# Patient Record
Sex: FEMALE | Race: WHITE | Employment: FULL TIME | URBAN - METROPOLITAN AREA
[De-identification: names, ages, dates, MRNs, and addresses within clinical notes are randomized per-mention and may not be internally consistent; named-entity substitution may affect disease eponyms.]

---

## 2018-02-27 ENCOUNTER — HOSPITAL ENCOUNTER (EMERGENCY)
Facility: CLINIC | Age: 38
Discharge: HOME | End: 2018-02-27
Attending: PHYSICIAN ASSISTANT | Admitting: PHYSICIAN ASSISTANT

## 2018-02-27 VITALS
DIASTOLIC BLOOD PRESSURE: 80 MMHG | RESPIRATION RATE: 16 BRPM | SYSTOLIC BLOOD PRESSURE: 143 MMHG | TEMPERATURE: 98.5 F | WEIGHT: 140 LBS | BODY MASS INDEX: 22.5 KG/M2 | OXYGEN SATURATION: 100 % | HEART RATE: 104 BPM | HEIGHT: 66 IN

## 2018-02-27 DIAGNOSIS — H10.33 ACUTE CONJUNCTIVITIS OF BOTH EYES, UNSPECIFIED ACUTE CONJUNCTIVITIS TYPE: ICD-10-CM

## 2018-02-27 PROCEDURE — 99282 EMERGENCY DEPT VISIT SF MDM: CPT

## 2018-02-27 RX ORDER — POLYMYXIN B SULFATE AND TRIMETHOPRIM 1; 10000 MG/ML; [USP'U]/ML
2 SOLUTION OPHTHALMIC
Qty: 6 ML | Refills: 0 | Status: SHIPPED | OUTPATIENT
Start: 2018-02-27 | End: 2018-03-06

## 2018-02-27 RX ORDER — TOBRAMYCIN 3 MG/ML
1 SOLUTION/ DROPS OPHTHALMIC 4 TIMES DAILY
COMMUNITY

## 2018-02-27 ASSESSMENT — ENCOUNTER SYMPTOMS
HEADACHES: 0
EYE ITCHING: 1
COUGH: 0
SORE THROAT: 1
EYE DISCHARGE: 1
EYE PAIN: 1
EYE REDNESS: 1

## 2018-02-27 NOTE — ED AVS SNAPSHOT
[facility] Emergency Department    [address]    [facility] [address]    Phone:  [phone]    Fax:  [phone]                                       Morelia Clement   MRN: [id number]    Department:  [facility] Emergency Department   Date of Visit:  2/27/2018           Patient Information     Date Of Birth          1980        Your diagnoses for this visit were:     Acute conjunctivitis of both eyes, unspecified acute conjunctivitis type        You were seen by Lillian Myles PA-C.      Follow-up Information     Follow up with [facility] In 2 days.    Specialties:  Sports Medicine, Pain Management, Obstetrics & Gynecology, Pediatrics, Internal Medicine, Nephrology    Why:  As needed    Contact information:    [address]  [facility] [place] [address] [phone]        Follow up with [facility] Emergency Department.    Specialty:  EMERGENCY MEDICINE    Why:  If symptoms worsen    Contact information:    [address]  [facility] [place] [address] [phone]        Discharge Instructions         You can try Allegra or Zyrtec or Claritin if this is an allergic conjunctivitis.     Conjunctivitis, Nonspecific    The membrane that covers the white part of your eye (the conjunctiva) is inflamed. Inflammation happens when your body responds to an injury, allergic reaction, infection, or illness. Symptoms of inflammation in the eye may include redness, irritation, itching, swelling, or burning. These symptoms should go away within the next 24 hours. Conjunctivitis may be related to a particle that was in your eye. If so, it may wash out with your tears or irrigation treatment. Being exposed to liquid chemicals or fumes may also cause this reaction.   Home care    Apply a cold pack over the eye for 20 minutes at a time. This will reduce pain. To make a cold pack, put ice cubes in a plastic  bag that seals at the top. Wrap the bag in a clean, thin towel or cloth.    Artificial tears may be prescribed to reduce irritation or redness.  These should be used 3 to 4 times a day.    You may use acetaminophen or ibuprofen to control pain, unless another medicine was prescribed. (Note: If you have chronic liver or kidney disease, or if you have ever had a stomach ulcer or gastrointestinal bleeding, talk with your healthcare provider before using these medicines.)  Follow-up care  Follow up with your healthcare provider, or as advised.  When to seek medical advice  Call your healthcare provider right away if any of these occur:    Increased eyelid swelling    Increased eye pain    Increased redness or drainage from the eye    Increased blurry vision or increased sensitivity to light    Failure of normal vision to return within 24 to 48 hours  Date Last Reviewed: 7/1/2017 2000-2017 [employer]. [address] [place] [address]. All rights reserved. This information is not intended as a substitute for professional medical care. Always follow your healthcare professional's instructions.          24 Hour Appointment Hotline       To make an appointment at any [facility], call [phone] ([phone]). If you don't have a family doctor or clinic, we will help you find one. [facility] are conveniently located to serve the needs of you and your family.             Review of your medicines      START taking        Dose / Directions Last dose taken    trimethoprim-polymyxin b ophthalmic solution   Commonly known as:  POLYTRIM   Dose:  2 drop   Quantity:  6 mL        Place 2 drops into both eyes every 3 hours for 7 days   Refills:  0          Our records show that you are taking the medicines listed below. If these are incorrect, please call your family doctor or clinic.        Dose / Directions Last dose taken    tobramycin 0.3 % ophthalmic solution   Commonly known as:  TOBREX    Dose:  1 drop        Place 1 drop into both eyes 4 times daily   Refills:  0                Prescriptions were sent or printed at these locations (1 Prescription)                   Other Prescriptions                Printed at Department/Unit printer (1 of 1)         trimethoprim-polymyxin b (POLYTRIM) ophthalmic solution                Orders Needing Specimen Collection     None      Pending Results     No orders found from 2/25/2018 to 2/28/2018.            Pending Culture Results     No orders found from 2/25/2018 to 2/28/2018.            Pending Results Instructions     If you had any lab results that were not finalized at the time of your Discharge, you can call the ED Lab Result RN at [phone]. You will be contacted by this team for any positive Lab results or changes in treatment. The nurses are available 7 days a week from 10A to 6:30P.  You can leave a message 24 hours per day and they will return your call.        Test Results From Your Hospital Stay               Clinical Quality Measure: Blood Pressure Screening     Your blood pressure was checked while you were in the emergency department today. The last reading we obtained was  BP: 143/80 . Please read the guidelines below about what these numbers mean and what you should do about them.  If your systolic blood pressure (the top number) is less than 120 and your diastolic blood pressure (the bottom number) is less than 80, then your blood pressure is normal. There is nothing more that you need to do about it.  If your systolic blood pressure (the top number) is 120-139 or your diastolic blood pressure (the bottom number) is 80-89, your blood pressure may be higher than it should be. You should have your blood pressure rechecked within a year by a primary care provider.  If your systolic blood pressure (the top number) is 140 or greater or your diastolic blood pressure (the bottom number) is 90 or greater, you may have high blood pressure. High  "blood pressure is treatable, but if left untreated over time it can put you at risk for heart attack, stroke, or kidney failure. You should have your blood pressure rechecked by a primary care provider within the next 4 weeks.  If your provider in the emergency department today gave you specific instructions to follow-up with your doctor or provider even sooner than that, you should follow that instruction and not wait for up to 4 weeks for your follow-up visit.        Thank you for choosing [facility]       Thank you for choosing [facility] for your care. Our goal is always to provide you with excellent care. Hearing back from our patients is one way we can continue to improve our services. Please take a few minutes to complete the written survey that you may receive in the mail after you visit with us. Thank you!        [employer] Information     [employer] lets you send messages to your doctor, view your test results, renew your prescriptions, schedule appointments and more. To sign up, go to [email or web address] . Click on \"Log in\" on the left side of the screen, which will take you to the Welcome page. Then click on \"Sign up Now\" on the right side of the page.     You will be asked to enter the access code listed below, as well as some personal information. Please follow the directions to create your username and password.     Your access code is: [email or web address]  Expires: 2018  4:44 PM     Your access code will  in 90 days. If you need help or a new code, please call your [facility] clinic or [phone].        Care EveryWhere ID     This is your Care EveryWhere ID. This could be used by other organizations to access your [facility] medical records  [id number]        Equal Access to Services     SELINA MARIN : nadira Nicole, melanie womack. So [facility] [phone].    ATENCIÓN: Si habla español, tiene a mosher disposición " servicios gratuitos de asistencia lingüística. Kevin quijano [phone].    We comply with applicable federal civil rights laws and [place] laws. We do not discriminate on the basis of race, color, national origin, age, disability, sex, sexual orientation, or gender identity.            After Visit Summary       This is your record. Keep this with you and show to your community pharmacist(s) and doctor(s) at your next visit.

## 2018-02-27 NOTE — ED AVS SNAPSHOT
[facility] Emergency Department    [address]    [facility] [address]    Phone:  [phone]    Fax:  [phone]                                       Morelia Clement   MRN: [id number]    Department:  [facility] Emergency Department   Date of Visit:  2/27/2018           After Visit Summary Signature Page     I have received my discharge instructions, and my questions have been answered. I have discussed any challenges I see with this plan with the nurse or doctor.    ..........................................................................................................................................  Patient/Patient Representative Signature      ..........................................................................................................................................  Patient Representative Print Name and Relationship to Patient    ..................................................               ................................................  Date                                            Time    ..........................................................................................................................................  Reviewed by Signature/Title    ...................................................              ..............................................  Date                                                            Time

## 2018-02-27 NOTE — ED NOTES
Pt reports she was seen at [facility] for pink eye on 2/16/18 and given medication but her symptoms have only gotten worse. Pt states she has redness, itching, watery eyes and a yellow discharge.

## 2018-02-27 NOTE — DISCHARGE INSTRUCTIONS
You can try Allegra or Zyrtec or Claritin if this is an allergic conjunctivitis.     Conjunctivitis, Nonspecific    The membrane that covers the white part of your eye (the conjunctiva) is inflamed. Inflammation happens when your body responds to an injury, allergic reaction, infection, or illness. Symptoms of inflammation in the eye may include redness, irritation, itching, swelling, or burning. These symptoms should go away within the next 24 hours. Conjunctivitis may be related to a particle that was in your eye. If so, it may wash out with your tears or irrigation treatment. Being exposed to liquid chemicals or fumes may also cause this reaction.   Home care    Apply a cold pack over the eye for 20 minutes at a time. This will reduce pain. To make a cold pack, put ice cubes in a plastic bag that seals at the top. Wrap the bag in a clean, thin towel or cloth.    Artificial tears may be prescribed to reduce irritation or redness.  These should be used 3 to 4 times a day.    You may use acetaminophen or ibuprofen to control pain, unless another medicine was prescribed. (Note: If you have chronic liver or kidney disease, or if you have ever had a stomach ulcer or gastrointestinal bleeding, talk with your healthcare provider before using these medicines.)  Follow-up care  Follow up with your healthcare provider, or as advised.  When to seek medical advice  Call your healthcare provider right away if any of these occur:    Increased eyelid swelling    Increased eye pain    Increased redness or drainage from the eye    Increased blurry vision or increased sensitivity to light    Failure of normal vision to return within 24 to 48 hours  Date Last Reviewed: 7/1/2017 2000-2017 [employer]. [address] [place] [address], [place] [address]. All rights reserved. This information is not intended as a substitute for professional medical care. Always follow your healthcare professional's instructions.

## 2018-02-27 NOTE — ED PROVIDER NOTES
"  History     Chief Complaint:  Eye Problem      HPI   Morelia Clement is a 37 year old female smoker who presents to the emergency department today for evaluation of an eye problem. The patient reports that she developed bilateral eye watering and redness on 02/14/2018. She states that the bilateral eyes then became dry, itchy, and scratchy/irritated feeling. She reports that she was evaluated at the [facility] on 02/16/2018 and was started on a 7 day course of tobramycin drops for conjunctivitis. She states that since she started these drops, her symptoms have worsened significantly. She describes watery, yellow discharge and adds that her eyes have crusted over when she wakes up in the morning. She denies vision changes, ear pain, cough, or headache. She states that she had a sore throat on 02/17/2018 and that this has since resolved. She notes some sneezing which she suspects is due to allergies.    Allergies:  No Known Drug Allergies     Medications:    Tobramycin ophthalmic solution    Past Medical History:    History reviewed. No pertinent medical history.    Past Surgical History:    History reviewed. No pertinent surgical history.    Family History:    History reviewed. No pertinent family history.    Social History:  Smoking Status: Current Every Day Smoker - 0.5 packs per day  Marital Status:  Single     Review of Systems   HENT: Positive for sneezing and sore throat (resolved). Negative for ear pain.    Eyes: Positive for pain (bilateral), discharge (yellow, watery, bilateral), redness (bilateral) and itching (bilateral). Negative for visual disturbance.        Positive for bilateral eye dryness.   Respiratory: Negative for cough.    Neurological: Negative for headaches.   All other systems reviewed and are negative.    Physical Exam     Patient Vitals for the past 24 hrs:   BP Temp Temp src Pulse Resp SpO2 Height Weight   02/27/18 1516 143/80 98.5  F (36.9  C) Oral 104 16 100 % 1.676 m (5' 6\") " 63.5 kg (140 lb)     Physical Exam  Constitutional: Alert, attentive  HENT:    Nose: Nose normal.    Mouth/Throat: Oropharynx is clear, mucous membranes are moist   Eyes: EOM are normal. Pupils are equal, round, and reactive to light. There is bilateral conjunctival injection with mattering discharge. No pain with EOM.   CV: regular rate and rhythm  Chest: Effort normal and breath sounds normal.   MSK: Normal range of motion.   Neurological: Alert, attentive  Skin: Skin is warm and dry.     Emergency Department Course     Emergency Department Course:  Nursing notes and vitals reviewed.  I performed an exam of the patient as documented above.   1635 I discussed the treatment plan with the patient. They expressed understanding of this plan and consented to discharge. They will be discharged home with instructions for care and follow up. In addition, the patient will return to the emergency department if their symptoms persist, worsen, if new symptoms arise or if there is any concern.  All questions were answered.    Impression & Plan      Medical Decision Making:  Morelia Clement is a 37 year old female who presents for evaluation of bilateral eye redness.  A broad differential diagnosis was considered including bacterial conjunctivitis, viral conjunctivitis, foreign body, corneal abrasion, chemical vs allergic conjunctivitis, corneal ulcer, HSV, herpes zoster ophthalmicus, endophthalmitis, orbital cellulitis, etc.  Signs and symptoms consistent with an allergic versus bacterial conjunctivitis. I suspect that this may be due to either an allergic reaction to the tobramycin drops versus seasonal allergies or bacterial conjunctivitis. I prescribed Polytrim drops and she may also try Zyrtec or Allegra. We discussed that if her symptoms persist despite both of these treatments, she should follow up with opthalmology on Friday. The patient voiced understanding and agreement with this plan. All questions were answered prior  to discharge.    Diagnosis:    ICD-10-CM    1. Acute conjunctivitis of both eyes, unspecified acute conjunctivitis type H10.33      Disposition:  The patient is discharged to home.    Discharge Medications:  Discharge Medication List as of 2/27/2018  4:45 PM      START taking these medications    Details   trimethoprim-polymyxin b (POLYTRIM) ophthalmic solution Place 2 drops into both eyes every 3 hours for 7 days, Disp-6 mL, R-0, Local Print           Scribe Disclosure:  I, Billy Kellogg, am serving as a scribe at 4:20 PM on 2/27/2018 to document services personally performed by Lillian Myles PA-C, based on my observations and the provider's statements to me.  [facility] EMERGENCY DEPARTMENT     Lillian Myles PA-C  02/27/18 6191

## 2018-03-02 ENCOUNTER — HOSPITAL ENCOUNTER (EMERGENCY)
Facility: CLINIC | Age: 38
Discharge: HOME | End: 2018-03-02
Attending: NURSE PRACTITIONER | Admitting: NURSE PRACTITIONER

## 2018-03-02 VITALS
SYSTOLIC BLOOD PRESSURE: 123 MMHG | RESPIRATION RATE: 16 BRPM | TEMPERATURE: 97.3 F | OXYGEN SATURATION: 99 % | DIASTOLIC BLOOD PRESSURE: 85 MMHG

## 2018-03-02 DIAGNOSIS — H10.33 ACUTE BACTERIAL CONJUNCTIVITIS OF BOTH EYES: ICD-10-CM

## 2018-03-02 PROCEDURE — 25000125 ZZHC RX 250

## 2018-03-02 PROCEDURE — 99283 EMERGENCY DEPT VISIT LOW MDM: CPT

## 2018-03-02 PROCEDURE — 87591 N.GONORRHOEAE DNA AMP PROB: CPT | Performed by: NURSE PRACTITIONER

## 2018-03-02 RX ORDER — TETRACAINE HYDROCHLORIDE 5 MG/ML
SOLUTION OPHTHALMIC
Status: COMPLETED
Start: 2018-03-02 | End: 2018-03-02

## 2018-03-02 RX ORDER — IBUPROFEN 600 MG/1
600 TABLET, FILM COATED ORAL ONCE
Status: DISCONTINUED | OUTPATIENT
Start: 2018-03-02 | End: 2018-03-02 | Stop reason: HOSPADM

## 2018-03-02 RX ORDER — IBUPROFEN 600 MG/1
600 TABLET, FILM COATED ORAL EVERY 6 HOURS PRN
Qty: 30 TABLET | Refills: 1 | Status: SHIPPED | OUTPATIENT
Start: 2018-03-02

## 2018-03-02 RX ADMIN — FLUORESCEIN SODIUM: 0.6 STRIP OPHTHALMIC at 13:30

## 2018-03-02 RX ADMIN — TETRACAINE HYDROCHLORIDE: 5 SOLUTION OPHTHALMIC at 13:00

## 2018-03-02 ASSESSMENT — ENCOUNTER SYMPTOMS
EYE REDNESS: 1
EYE DISCHARGE: 1
EYE PAIN: 1
EYE ITCHING: 1

## 2018-03-02 NOTE — ED PROVIDER NOTES
History     Chief Complaint:  Eye Drainage    HPI   Morelia Clement is a 37 year old female smoker who presents to the emergency department today for evaluation of an eye problem. The patient reports that she developed bilateral eye watering and redness on 02/14/2018. She states that the bilateral eyes then became dry, itchy, and scratchy/irritated feeling. She reports that she was evaluated at the [facility] on 02/16/2018 and was started on a 7 day course of tobramycin drops for conjunctivitis. These were not effective. She presented here 3 days ago for repeat evaluation, and was given a new Polytrim drop, as well as advise to try Zyrtec or Allegra. These were ineffective. She presents today again for evaluation of her ongoing symptoms. She describes watery, yellow discharge and adds that her eyes have crusted over when she wakes up in the morning. Her eyes are red and painful. She is unsure if her blurry vision is secondary to discharge or she is actually having a visual disturbance. She states that she had a sore throat on 02/17/2018 and that this has since resolved. She notes some sneezing which she suspects is due to allergies. She explains that she had been on a cruise in [place] 2 weeks prior to onset of symptoms. No contact lens use.     Allergies:  No Known Drug Allergies      Medications:    tobramycin (TOBREX) 0.3 % ophthalmic solution  trimethoprim-polymyxin b (POLYTRIM) ophthalmic solution     Past Medical History:    Endometriosis  No DM  No yeast infections      Past Surgical History:    History reviewed. No pertinent surgical history.     Family History:    History reviewed. No pertinent family history.     Social History:  Smoking Status: Current Every Day Smoker - 0.5 packs per day  Marital Status:  Single who presents with her boyfriend     Review of Systems   Eyes: Positive for pain, discharge, redness, itching and visual disturbance (she is unsure if it is blurry secondary to  discharge).     Physical Exam     Patient Vitals for the past 24 hrs:   BP Temp Temp src Heart Rate Resp SpO2   03/02/18 1227 123/85 97.3  F (36.3  C) Temporal 85 16 99 %      Physical Exam  Eyes: Pupils equally round  HENT: Head is normal in appearance. Oropharynx is normal with moist mucus membranes. The bilateral eyes are red and clearly irritated with watery discharge noted. No fluorescein uptake bilaterally to suggest corneal abrasion or foreign body.    Cardiovascular: Normal color of mucus membranes  Respiratory: Normal respiratory effort  Musculoskeletal: No asymmetry  Skin: Normal, without rash.  Lymphatic: No edema  Neurologic: Cranial nerves grossly intact, normal cognition, no apparent deficits.  Psychiatric: Normal affect.    Emergency Department Course     Laboratory:  Laboratory findings were communicated with the patient who voiced understanding of the findings.  Neisseria Gonorrhea PCR: in process  Neisseria Gonorrhea PCR: in process    Emergency Department Course:  Nursing notes and vitals reviewed.  I performed an exam of the patient as documented above.   Findings and plan explained to the patient. Patient discharged home with instructions regarding supportive care, medications and reasons to return. The importance of close follow-up was reviewed.      Impression & Plan      Medical Decision Making:   Morelia Clement is a 37 year old female who presents for evaluation of bilateral eye redness, irritation, and discharge.  A broad differential diagnosis was considered including bacterial conjunctivitis, viral conjunctivitis, foreign body, corneal abrasion, chemical vs allergic conjunctivitis, corneal ulcer, HSV, herpes zoster ophthalmicus, endophthalmitis, orbital cellulitis, etc.  Signs and symptoms consistent with a conjunctivitis, likely bacterial. Will give new antibiotics and have close follow-up of eye physician. I am concerned that she may be experiencing gonorrheal conjunctivitis due to her  persistent symptoms despite 2 different antibiotics. I have sent swabs to the [facility] for testing, and the patient will be notified in the next 3 days of these results.     Diagnosis:    ICD-10-CM    1. Acute bacterial conjunctivitis of both eyes H10.33 Neisseria gonorrhoea PCR     Neisseria gonorrhoeae PCR      Disposition:   Discharged to home with below prescriptions    Discharge Medications:  New Prescriptions    CIPROFLOXACIN (CIPRO) 0.3 %    Place 3 drops into both eyes 3 times daily    IBUPROFEN (ADVIL/MOTRIN) 600 MG TABLET    Take 1 tablet (600 mg) by mouth every 6 hours as needed for moderate pain     Scribe Disclosure:  I, Mariano Espinoza, am serving as a scribe at 12:33 PM on 3/2/2018 to document services personally performed by Lyla Courtney, REGGIE C*, based on my observations and the provider's statements to me.   [facility] EMERGENCY DEPARTMENT       Lyla Courtney APRN CNP  03/02/18 8801

## 2018-03-02 NOTE — DISCHARGE INSTRUCTIONS

## 2018-03-02 NOTE — ED AVS SNAPSHOT
[facility] Emergency Department    [address] [place] [address]    Phone:  [phone]    Fax:  [phone]                                       Morelia Clement   MRN: [id number]    Department:  [facility] Emergency Department   Date of Visit:  3/2/2018           Patient Information     Date Of Birth          1980        Your diagnoses for this visit were:     Acute bacterial conjunctivitis of both eyes        You were seen by Lyla Courtney, REGGIE SUMMERS.      Follow-up Information     Please follow up.    Why:  f/u with opthamology in 24-48 hours        Discharge Instructions         Conjunctivitis, Nonspecific    The membrane that covers the white part of your eye (the conjunctiva) is inflamed. Inflammation happens when your body responds to an injury, allergic reaction, infection, or illness. Symptoms of inflammation in the eye may include redness, irritation, itching, swelling, or burning. These symptoms should go away within the next 24 hours. Conjunctivitis may be related to a particle that was in your eye. If so, it may wash out with your tears or irrigation treatment. Being exposed to liquid chemicals or fumes may also cause this reaction.   Home care    Apply a cold pack over the eye for 20 minutes at a time. This will reduce pain. To make a cold pack, put ice cubes in a plastic bag that seals at the top. Wrap the bag in a clean, thin towel or cloth.    Artificial tears may be prescribed to reduce irritation or redness.  These should be used 3 to 4 times a day.    You may use acetaminophen or ibuprofen to control pain, unless another medicine was prescribed. (Note: If you have chronic liver or kidney disease, or if you have ever had a stomach ulcer or gastrointestinal bleeding, talk with your healthcare provider before using these medicines.)  Follow-up care  Follow up with your healthcare provider, or as advised.  When to seek medical advice  Call your  healthcare provider right away if any of these occur:    Increased eyelid swelling    Increased eye pain    Increased redness or drainage from the eye    Increased blurry vision or increased sensitivity to light    Failure of normal vision to return within 24 to 48 hours  Date Last Reviewed: 7/1/2017 2000-2017 [employer] [address] [place] [address]. All rights reserved. This information is not intended as a substitute for professional medical care. Always follow your healthcare professional's instructions.          24 Hour Appointment Hotline       To make an appointment at any [facility], call [phone] ([phone]). If you don't have a family doctor or clinic, we will help you find one. [facility] are conveniently located to serve the needs of you and your family.             Review of your medicines      START taking        Dose / Directions Last dose taken    ciprofloxacin 0.3 %   Commonly known as:  CIPRO   Dose:  3 drop   Quantity:  1 Bottle        Place 3 drops into both ears 3 times daily   Refills:  0        ibuprofen 600 MG tablet   Commonly known as:  ADVIL/MOTRIN   Dose:  600 mg   Quantity:  30 tablet        Take 1 tablet (600 mg) by mouth every 6 hours as needed for moderate pain   Refills:  1          Our records show that you are taking the medicines listed below. If these are incorrect, please call your family doctor or clinic.        Dose / Directions Last dose taken    tobramycin 0.3 % ophthalmic solution   Commonly known as:  TOBREX   Dose:  1 drop        Place 1 drop into both eyes 4 times daily   Refills:  0        trimethoprim-polymyxin b ophthalmic solution   Commonly known as:  POLYTRIM   Dose:  2 drop   Quantity:  6 mL        Place 2 drops into both eyes every 3 hours for 7 days   Refills:  0                Prescriptions were sent or printed at these locations (2 Prescriptions)                   Other Prescriptions                Printed  at Department/Unit printer (2 of 2)         ciprofloxacin (CIPRO) 0.3 %               ibuprofen (ADVIL/MOTRIN) 600 MG tablet                Procedures and tests performed during your visit     Neisseria gonorrhoea PCR    Neisseria gonorrhoeae PCR      Orders Needing Specimen Collection     None      Pending Results     No orders found from 2/28/2018 to 3/3/2018.            Pending Culture Results     No orders found from 2/28/2018 to 3/3/2018.            Pending Results Instructions     If you had any lab results that were not finalized at the time of your Discharge, you can call the ED Lab Result RN at [phone]. You will be contacted by this team for any positive Lab results or changes in treatment. The nurses are available 7 days a week from 10A to 6:30P.  You can leave a message 24 hours per day and they will return your call.        Test Results From Your Hospital Stay               Clinical Quality Measure: Blood Pressure Screening     Your blood pressure was checked while you were in the emergency department today. The last reading we obtained was  BP: 123/85 . Please read the guidelines below about what these numbers mean and what you should do about them.  If your systolic blood pressure (the top number) is less than 120 and your diastolic blood pressure (the bottom number) is less than 80, then your blood pressure is normal. There is nothing more that you need to do about it.  If your systolic blood pressure (the top number) is 120-139 or your diastolic blood pressure (the bottom number) is 80-89, your blood pressure may be higher than it should be. You should have your blood pressure rechecked within a year by a primary care provider.  If your systolic blood pressure (the top number) is 140 or greater or your diastolic blood pressure (the bottom number) is 90 or greater, you may have high blood pressure. High blood pressure is treatable, but if left untreated over time it can put you at risk for heart  "attack, stroke, or kidney failure. You should have your blood pressure rechecked by a primary care provider within the next 4 weeks.  If your provider in the emergency department today gave you specific instructions to follow-up with your doctor or provider even sooner than that, you should follow that instruction and not wait for up to 4 weeks for your follow-up visit.        Thank you for choosing [facility]       Thank you for choosing [facility] for your care. Our goal is always to provide you with excellent care. Hearing back from our patients is one way we can continue to improve our services. Please take a few minutes to complete the written survey that you may receive in the mail after you visit with us. Thank you!        [employer] Information     [employer] lets you send messages to your doctor, view your test results, renew your prescriptions, schedule appointments and more. To sign up, go to [email or web address] . Click on \"Log in\" on the left side of the screen, which will take you to the Welcome page. Then click on \"Sign up Now\" on the right side of the page.     You will be asked to enter the access code listed below, as well as some personal information. Please follow the directions to create your username and password.     Your access code is: [email or web address]  Expires: 2018  4:44 PM     Your access code will  in 90 days. If you need help or a new code, please call your [facility] clinic or [phone].        Care EveryWhere ID     This is your Care EveryWhere ID. This could be used by other organizations to access your [facility] medical records  [id number]        Equal Access to Services     SELINA MARIN : Hadii duong Herman, waaxda lunavarroadaha, qaybta kaalmelanie segovia. So [facility] [phone].    ATENCIÓN: Si habla español, tiene a mosher disposición servicios gratuitos de asistencia lingüística. Llame al [phone].    We comply with applicable " federal civil rights laws and [place] laws. We do not discriminate on the basis of race, color, national origin, age, disability, sex, sexual orientation, or gender identity.            After Visit Summary       This is your record. Keep this with you and show to your community pharmacist(s) and doctor(s) at your next visit.

## 2018-03-02 NOTE — ED AVS SNAPSHOT
[facility] Emergency Department    [address]    [facility] [address]    Phone:  [phone]    Fax:  [phone]                                       Morelia Clement   MRN: [id number]    Department:  [facility] Emergency Department   Date of Visit:  3/2/2018           After Visit Summary Signature Page     I have received my discharge instructions, and my questions have been answered. I have discussed any challenges I see with this plan with the nurse or doctor.    ..........................................................................................................................................  Patient/Patient Representative Signature      ..........................................................................................................................................  Patient Representative Print Name and Relationship to Patient    ..................................................               ................................................  Date                                            Time    ..........................................................................................................................................  Reviewed by Signature/Title    ...................................................              ..............................................  Date                                                            Time

## 2018-03-02 NOTE — ED NOTES
"Seen at the clinic on 2/1+6 for \"pink eye\". Seen here last Monday or Tuesday for same symptoms. Back again today with continued discomfort.   "

## 2018-03-04 LAB
N GONORRHOEA DNA SPEC QL NAA+PROBE: NEGATIVE
N GONORRHOEA DNA SPEC QL NAA+PROBE: NEGATIVE
SPECIMEN SOURCE: NORMAL
SPECIMEN SOURCE: NORMAL

## 2018-09-24 ENCOUNTER — HOSPITAL ENCOUNTER (EMERGENCY)
Facility: CLINIC | Age: 38
Discharge: HOME | End: 2018-09-24
Attending: EMERGENCY MEDICINE | Admitting: EMERGENCY MEDICINE
Payer: COMMERCIAL

## 2018-09-24 VITALS
RESPIRATION RATE: 20 BRPM | BODY MASS INDEX: 24.11 KG/M2 | TEMPERATURE: 97.8 F | WEIGHT: 150 LBS | HEIGHT: 66 IN | DIASTOLIC BLOOD PRESSURE: 94 MMHG | SYSTOLIC BLOOD PRESSURE: 130 MMHG | HEART RATE: 105 BPM | OXYGEN SATURATION: 97 %

## 2018-09-24 DIAGNOSIS — K92.0 HEMATEMESIS, PRESENCE OF NAUSEA NOT SPECIFIED: ICD-10-CM

## 2018-09-24 LAB
ANION GAP SERPL CALCULATED.3IONS-SCNC: 7 MMOL/L (ref 3–14)
BASOPHILS # BLD AUTO: 0.1 10E9/L (ref 0–0.2)
BASOPHILS NFR BLD AUTO: 0.6 %
BUN SERPL-MCNC: 18 MG/DL (ref 7–30)
CALCIUM SERPL-MCNC: 9 MG/DL (ref 8.5–10.1)
CHLORIDE SERPL-SCNC: 109 MMOL/L (ref 94–109)
CO2 SERPL-SCNC: 23 MMOL/L (ref 20–32)
CREAT SERPL-MCNC: 0.73 MG/DL (ref 0.52–1.04)
DIFFERENTIAL METHOD BLD: NORMAL
EOSINOPHIL # BLD AUTO: 0.1 10E9/L (ref 0–0.7)
EOSINOPHIL NFR BLD AUTO: 1.1 %
ERYTHROCYTE [DISTWIDTH] IN BLOOD BY AUTOMATED COUNT: 12.7 % (ref 10–15)
GFR SERPL CREATININE-BSD FRML MDRD: 89 ML/MIN/1.7M2
GLUCOSE SERPL-MCNC: 90 MG/DL (ref 70–99)
HCT VFR BLD AUTO: 40.3 % (ref 35–47)
HGB BLD-MCNC: 13.5 G/DL (ref 11.7–15.7)
IMM GRANULOCYTES # BLD: 0 10E9/L (ref 0–0.4)
IMM GRANULOCYTES NFR BLD: 0.2 %
LYMPHOCYTES # BLD AUTO: 2.7 10E9/L (ref 0.8–5.3)
LYMPHOCYTES NFR BLD AUTO: 32.4 %
MCH RBC QN AUTO: 32 PG (ref 26.5–33)
MCHC RBC AUTO-ENTMCNC: 33.5 G/DL (ref 31.5–36.5)
MCV RBC AUTO: 96 FL (ref 78–100)
MONOCYTES # BLD AUTO: 0.6 10E9/L (ref 0–1.3)
MONOCYTES NFR BLD AUTO: 7.7 %
NEUTROPHILS # BLD AUTO: 4.8 10E9/L (ref 1.6–8.3)
NEUTROPHILS NFR BLD AUTO: 58 %
NRBC # BLD AUTO: 0 10*3/UL
NRBC BLD AUTO-RTO: 0 /100
PLATELET # BLD AUTO: 205 10E9/L (ref 150–450)
POTASSIUM SERPL-SCNC: 3.8 MMOL/L (ref 3.4–5.3)
RBC # BLD AUTO: 4.22 10E12/L (ref 3.8–5.2)
SODIUM SERPL-SCNC: 139 MMOL/L (ref 133–144)
WBC # BLD AUTO: 8.2 10E9/L (ref 4–11)

## 2018-09-24 PROCEDURE — 80048 BASIC METABOLIC PNL TOTAL CA: CPT | Performed by: EMERGENCY MEDICINE

## 2018-09-24 PROCEDURE — 99283 EMERGENCY DEPT VISIT LOW MDM: CPT

## 2018-09-24 PROCEDURE — 25000132 ZZH RX MED GY IP 250 OP 250 PS 637: Performed by: EMERGENCY MEDICINE

## 2018-09-24 PROCEDURE — 85025 COMPLETE CBC W/AUTO DIFF WBC: CPT | Performed by: EMERGENCY MEDICINE

## 2018-09-24 RX ADMIN — RANITIDINE 150 MG: 150 TABLET ORAL at 00:40

## 2018-09-24 ASSESSMENT — ENCOUNTER SYMPTOMS
COUGH: 0
BRUISES/BLEEDS EASILY: 0
FEVER: 0
CONSTIPATION: 0
VOMITING: 1
DIARRHEA: 0
RHINORRHEA: 0
NAUSEA: 1
SORE THROAT: 1
ABDOMINAL PAIN: 1

## 2018-09-24 NOTE — ED AVS SNAPSHOT
[facility] Emergency Department    [address]    [facility] [address]    Phone:  [phone]    Fax:  [phone]                                       Morelia Clement   MRN: [id number]    Department:  [facility] Emergency Department   Date of Visit:  9/24/2018           Patient Information     Date Of Birth          1980        Your diagnoses for this visit were:     Hematemesis, presence of nausea not specified        You were seen by Villa Talamantes DO.      Follow-up Information     Follow up with [facility]. Call today.    Specialty:  Family Medicine    Why:  For follow up and to establish care with a primary care clinic.    Contact information:    [address]  [facility] [address] [phone]        Follow up with [facility] Emergency Department.    Specialty:  EMERGENCY MEDICINE    Why:  If symptoms worsen    Contact information:    [address]  [facility] [id number] [phone]        Discharge Instructions         Upper GI Bleeding (Stable)  Your upper gastrointestinal (GI) tract includes your esophagus, stomach, and upper small intestine. You have signs of bleeding from your upper GI tract. You may have vomited or coughed up blood or coffee-ground like material. Or you may have black or tarry stools. Very small amounts of GI bleeding may not be visible and can only be found by a test of the stool.  Causes of upper GI bleeding can include:    Tear in the lining of the esophagus    Enlarged veins in the esophagus    An ulcer in the stomach or top of the small intestine    Severe irritation of the stomach    Inflammation of the digestive tract  A bloody nose or mouth or dental problems may cause blood to be swallowed and vomited up again. This is not true GI bleeding. Iron supplements and medicines for diarrhea and upset stomach can cause black stools. This is not GI bleeding and is  not a cause for concern.  Home care  Depending on the cause of your bleeding, care may include the following:    You may be given medicines to help protect your GI tract, treat your problem, and promote healing. Take these as directed.    Don't take NSAIDs, such as aspirin, ibuprofen, or naproxen. They can irritate the stomach and cause further bleeding. If you are taking these medicines for other medical reasons, talk to your healthcare provider before you stop them.      Don't use alcohol, caffeine, or tobacco. These can delay healing and make your problem worse.  Follow-up care  Follow up with your healthcare provider, or as advised. Further tests may need to be done to find the cause of your bleeding.  When to seek medical advice  Call your healthcare provider right away for any of the following:    Stomach pain appears or gets worse    Pain spreads to the neck, back, shoulder, or arm    Weakness or dizziness    Swelling of your abdomen    Red blood in your stool    Fever of 100.4 F (38 C) or higher, or as directed by your healthcare provider  Call 911  Call 911 if any of these occur:    Trouble breathing or swallowing    Severe dizziness    Loss of consciousness    Vomiting blood or large amounts of blood in the stool  Date Last Reviewed: 6/24/2015 2000-2017 [employer]. [address] [place] [address]. All rights reserved. This information is not intended as a substitute for professional medical care. Always follow your healthcare professional's instructions.          24 Hour Appointment Hotline       To make an appointment at any [facility], call [phone] ([phone]). If you don't have a family doctor or clinic, we will help you find one. [facility] clinics are conveniently located to serve the needs of you and your family.             Review of your medicines      START taking        Dose / Directions Last dose taken    ranitidine 75 MG tablet   Commonly known as:   ZANTAC   Dose:  75 mg   Quantity:  60 tablet        Take 1 tablet (75 mg) by mouth 2 times daily   Refills:  0          Our records show that you are taking the medicines listed below. If these are incorrect, please call your family doctor or clinic.        Dose / Directions Last dose taken    ciprofloxacin 0.3 %   Commonly known as:  CIPRO   Dose:  3 drop   Quantity:  1 Bottle        Place 3 drops into both ears 3 times daily   Refills:  0        ibuprofen 600 MG tablet   Commonly known as:  ADVIL/MOTRIN   Dose:  600 mg   Quantity:  30 tablet        Take 1 tablet (600 mg) by mouth every 6 hours as needed for moderate pain   Refills:  1        tobramycin 0.3 % ophthalmic solution   Commonly known as:  TOBREX   Dose:  1 drop        Place 1 drop into both eyes 4 times daily   Refills:  0                Prescriptions were sent or printed at these locations (1 Prescription)                   Other Prescriptions                Printed at Department/Unit printer (1 of 1)         ranitidine (ZANTAC) 75 MG tablet                Procedures and tests performed during your visit     Basic metabolic panel    CBC with platelets differential    Peripheral IV catheter      Orders Needing Specimen Collection     None      Pending Results     No orders found from 9/22/2018 to 9/25/2018.            Pending Culture Results     No orders found from 9/22/2018 to 9/25/2018.            Pending Results Instructions     If you had any lab results that were not finalized at the time of your Discharge, you can call the ED Lab Result RN at [phone]. You will be contacted by this team for any positive Lab results or changes in treatment. The nurses are available 7 days a week from 10A to 6:30P.  You can leave a message 24 hours per day and they will return your call.        Test Results From Your Hospital Stay        9/24/2018  1:00 AM      Component Results     Component Value Ref Range & Units Status    WBC 8.2 4.0 - 11.0 10e9/L Final     RBC Count 4.22 3.8 - 5.2 10e12/L Final    Hemoglobin 13.5 11.7 - 15.7 g/dL Final    Hematocrit 40.3 35.0 - 47.0 % Final    MCV 96 78 - 100 fl Final    MCH 32.0 26.5 - 33.0 pg Final    MCHC 33.5 31.5 - 36.5 g/dL Final    RDW 12.7 10.0 - 15.0 % Final    Platelet Count 205 150 - 450 10e9/L Final    Diff Method Automated Method  Final    % Neutrophils 58.0 % Final    % Lymphocytes 32.4 % Final    % Monocytes 7.7 % Final    % Eosinophils 1.1 % Final    % Basophils 0.6 % Final    % Immature Granulocytes 0.2 % Final    Nucleated RBCs 0 0 /100 Final    Absolute Neutrophil 4.8 1.6 - 8.3 10e9/L Final    Absolute Lymphocytes 2.7 0.8 - 5.3 10e9/L Final    Absolute Monocytes 0.6 0.0 - 1.3 10e9/L Final    Absolute Eosinophils 0.1 0.0 - 0.7 10e9/L Final    Absolute Basophils 0.1 0.0 - 0.2 10e9/L Final    Abs Immature Granulocytes 0.0 0 - 0.4 10e9/L Final    Absolute Nucleated RBC 0.0  Final         9/24/2018  1:17 AM      Component Results     Component Value Ref Range & Units Status    Sodium 139 133 - 144 mmol/L Final    Potassium 3.8 3.4 - 5.3 mmol/L Final    Chloride 109 94 - 109 mmol/L Final    Carbon Dioxide 23 20 - 32 mmol/L Final    Anion Gap 7 3 - 14 mmol/L Final    Glucose 90 70 - 99 mg/dL Final    Urea Nitrogen 18 7 - 30 mg/dL Final    Creatinine 0.73 0.52 - 1.04 mg/dL Final    GFR Estimate 89 >60 mL/min/1.7m2 Final    Non  GFR Calc    GFR Estimate If Black >90 >60 mL/min/1.7m2 Final    African American GFR Calc    Calcium 9.0 8.5 - 10.1 mg/dL Final                Clinical Quality Measure: Blood Pressure Screening     Your blood pressure was checked while you were in the emergency department today. The last reading we obtained was  BP: (!) 130/94 . Please read the guidelines below about what these numbers mean and what you should do about them.  If your systolic blood pressure (the top number) is less than 120 and your diastolic blood pressure (the bottom number) is less than 80, then your blood pressure  "is normal. There is nothing more that you need to do about it.  If your systolic blood pressure (the top number) is 120-139 or your diastolic blood pressure (the bottom number) is 80-89, your blood pressure may be higher than it should be. You should have your blood pressure rechecked within a year by a primary care provider.  If your systolic blood pressure (the top number) is 140 or greater or your diastolic blood pressure (the bottom number) is 90 or greater, you may have high blood pressure. High blood pressure is treatable, but if left untreated over time it can put you at risk for heart attack, stroke, or kidney failure. You should have your blood pressure rechecked by a primary care provider within the next 4 weeks.  If your provider in the emergency department today gave you specific instructions to follow-up with your doctor or provider even sooner than that, you should follow that instruction and not wait for up to 4 weeks for your follow-up visit.        Thank you for choosing [facility]       Thank you for choosing [facility] for your care. Our goal is always to provide you with excellent care. Hearing back from our patients is one way we can continue to improve our services. Please take a few minutes to complete the written survey that you may receive in the mail after you visit with us. Thank you!        [employer] Information     [employer] lets you send messages to your doctor, view your test results, renew your prescriptions, schedule appointments and more. To sign up, go to [email or web address] . Click on \"Log in\" on the left side of the screen, which will take you to the Welcome page. Then click on \"Sign up Now\" on the right side of the page.     You will be asked to enter the access code listed below, as well as some personal information. Please follow the directions to create your username and password.     Your access code is: [email or web address]  Expires: 2018  1:22 AM     Your access code will  " in 90 days. If you need help or a new code, please call your [facility] clinic or [phone].        Care EveryWhere ID     This is your Care EveryWhere ID. This could be used by other organizations to access your [facility] medical records  [id number]        Equal Access to Services     SELINA MARIN : Nishant Herman, waaxda luqadaha, qaybta kaalmalloyd nicholson, melanie stearns. So [facility] [phone].    ATENCIÓN: Si habla español, tiene a mosher disposición servicios gratuitos de asistencia lingüística. Llame al [phone].    We comply with applicable federal civil rights laws and [place] laws. We do not discriminate on the basis of race, color, national origin, age, disability, sex, sexual orientation, or gender identity.            After Visit Summary       This is your record. Keep this with you and show to your community pharmacist(s) and doctor(s) at your next visit.

## 2018-09-24 NOTE — DISCHARGE INSTRUCTIONS
Upper GI Bleeding (Stable)  Your upper gastrointestinal (GI) tract includes your esophagus, stomach, and upper small intestine. You have signs of bleeding from your upper GI tract. You may have vomited or coughed up blood or coffee-ground like material. Or you may have black or tarry stools. Very small amounts of GI bleeding may not be visible and can only be found by a test of the stool.  Causes of upper GI bleeding can include:    Tear in the lining of the esophagus    Enlarged veins in the esophagus    An ulcer in the stomach or top of the small intestine    Severe irritation of the stomach    Inflammation of the digestive tract  A bloody nose or mouth or dental problems may cause blood to be swallowed and vomited up again. This is not true GI bleeding. Iron supplements and medicines for diarrhea and upset stomach can cause black stools. This is not GI bleeding and is not a cause for concern.  Home care  Depending on the cause of your bleeding, care may include the following:    You may be given medicines to help protect your GI tract, treat your problem, and promote healing. Take these as directed.    Don't take NSAIDs, such as aspirin, ibuprofen, or naproxen. They can irritate the stomach and cause further bleeding. If you are taking these medicines for other medical reasons, talk to your healthcare provider before you stop them.      Don't use alcohol, caffeine, or tobacco. These can delay healing and make your problem worse.  Follow-up care  Follow up with your healthcare provider, or as advised. Further tests may need to be done to find the cause of your bleeding.  When to seek medical advice  Call your healthcare provider right away for any of the following:    Stomach pain appears or gets worse    Pain spreads to the neck, back, shoulder, or arm    Weakness or dizziness    Swelling of your abdomen    Red blood in your stool    Fever of 100.4 F (38 C) or higher, or as directed by your healthcare  provider  Call 911  Call 911 if any of these occur:    Trouble breathing or swallowing    Severe dizziness    Loss of consciousness    Vomiting blood or large amounts of blood in the stool  Date Last Reviewed: 6/24/2015 2000-2017 [employer]. [address] [place] [address], [place] [address]. All rights reserved. This information is not intended as a substitute for professional medical care. Always follow your healthcare professional's instructions.

## 2018-09-24 NOTE — ED AVS SNAPSHOT
[facility] Emergency Department    [address]    [facility] [address]    Phone:  [phone]    Fax:  [phone]                                       Morelia Clement   MRN: [id number]    Department:  [facility] Emergency Department   Date of Visit:  9/24/2018           After Visit Summary Signature Page     I have received my discharge instructions, and my questions have been answered. I have discussed any challenges I see with this plan with the nurse or doctor.    ..........................................................................................................................................  Patient/Patient Representative Signature      ..........................................................................................................................................  Patient Representative Print Name and Relationship to Patient    ..................................................               ................................................  Date                                   Time    ..........................................................................................................................................  Reviewed by Signature/Title    ...................................................              ..............................................  Date                                               Time          22EPIC Rev 08/18

## 2018-09-24 NOTE — ED PROVIDER NOTES
History     Chief Complaint:  Hematemesis    The history is provided by the patient.      Morelia Clement is a 37 year old female who is an every day smoker who presents to the emergency department today for evaluation of hematemesis. The patient reports has been experiencing intermittent diffuse abdominal pain for the past couple of months that comes about every other day. Yesterday, the patient was out with her friends last night when she drank too much and chugged one drink causing an episode of hematemesis. She did not think anything of this last night as she thought this was alcohol related. Throughout the day today, she has been experiencing a sore throat with a copper taste in her mouth. She was concerned about the episode of hematemesis and sore throat prompting her visit to the emergency department. At arrival, she notes that she does not have abdominal pain. She denies fever, constipation, diarrhea, cold symptoms, easy bruising or bleeding, and cough.    Allergies:  No Known Drug Allergies    Medications:    The patient is currently on no regular medications.    Past Medical History:    History reviewed. No pertinent past medical history.    Past Surgical History:    History reviewed. No pertinent surgical history.    Family History:    History reviewed. No pertinent family history.     Social History:  The patient was accompanied to the ED by significant other.  Smoking Status: Current Every Day Smoker, 1.00 ppd  Smokeless Tobacco: Never  Alcohol Use: Yes, socially on weekends, 5-6 drinks/time  Marital Status:  Single     Review of Systems   Constitutional: Negative for fever.   HENT: Positive for sore throat. Negative for congestion and rhinorrhea.    Respiratory: Negative for cough.    Gastrointestinal: Positive for abdominal pain, nausea and vomiting. Negative for constipation and diarrhea.   Hematological: Does not bruise/bleed easily.   All other systems reviewed and are negative.    Physical Exam  "    Patient Vitals for the past 24 hrs:   BP Temp Pulse Resp SpO2 Height Weight   09/24/18 0023 (!) 130/94 97.8  F (36.6  C) 105 20 99 % 1.676 m (5' 6\") 68 kg (150 lb)         Physical Exam  Constitutional: Vital signs reviewed as above. There appears to be no acute distress  HEENT:    Head: No external signs of trauma. No lesions noted.   Eyes: PEERL, EOMI B/L, no pain or limitation of superior gaze   Ears: Normal B/L TM and external canals   Nose: Noncongested, no exudates. No rhinorrhea. No FB noted   Mouth/Throat:     Mucous membranes are moist and normal.     No Oropharyngeal exudate. Minimal oropharyngeal erythema noted.    No tonsilar swelling noted.     No uvular deviation noted.    No swelling noted on the floor of the mouth  Neck: FROM. Neck is supple  Cardiovascular:    Normal rate, regular rhythm and normal heart sounds.     Exam reveals no friction rub.     No murmur heard.  Pulmonary/Chest:    Effort normal and breath sounds normal.    No respiratory distress.    There are no wheezes.    There are no rales.   Abdominal:    Soft.    Bowel sounds normal.    There is no distension.    There is no tenderness on my exam.    There is no rebound or guarding.   Musculoskeletal:    Normal range of motion.    Normal Tone  Neurological: Patient is alert and oriented to person, place, and time.   Skin: Skin is warm and dry. Patient is not diaphoretic  Psychiatric: The patient appears calm      Emergency Department Course   Laboratory:  Laboratory findings were communicated with the patient and family who voiced understanding of the findings.  CBC: WNL. (WBC 8.2, HGB 13.5, )   BMP: AWNL (Creatinine 0.73)    Interventions:  0040 Zantac 150 mg PO    Emergency Department Course:  Nursing notes and vitals reviewed.  IV was inserted and blood was drawn for laboratory testing, results above.  0027: I performed an exam of the patient as documented above.   0112: Patient rechecked and updated.   Findings and plan " explained to the Patient and significant other. Patient discharged home with instructions regarding supportive care, medications, and reasons to return. The importance of close follow-up was reviewed. I prescribed the patient Zantac.   I personally reviewed the laboratory results with the Patient and spouse and answered all related questions prior to discharge.    Impression & Plan    Medical Decision Making:  This 37-year-old female patient presents the ED due to concerns for sore throat and vomiting blood.  Please see the HPI and exam for specifics.  The patient notes that she drinks several days out of the week and usually has 4-5 drinks when she does.  She notes intermittent abdominal pain quite frequently over the last several months.  She has no abdominal pain right now but notes last night when she was drinking she showed her drink and then vomited right after and noted some blood in it.  There was no additional vomiting and no additional hematemesis though the patient noted a copper sort of taste in her mouth.  Her exam today is unremarkable.  I do not see anything concerning in her throat other than some very minimal erythema.  Her hemoglobin is normal and at this time I believe she can be discharged.  I presume she had a small Awa Brand tear.  I will give her the on-call primary care clinic for today as she does not have one, prescribe her Zantac, encourage decreased alcohol consumption, and further outpatient follow-up.  Anticipatory guidance given prior to discharge.      Diagnosis:    ICD-10-CM    1. Hematemesis, presence of nausea not specified K92.0 Basic metabolic panel       Disposition:  discharged to home    Discharge Medications:  New Prescriptions    RANITIDINE (ZANTAC) 75 MG TABLET    Take 1 tablet (75 mg) by mouth 2 times daily       Scribe Disclosure:  Ronni TIDWELL am serving as a scribe at 12:27 AM on 9/24/2018 to document services personally performed by Villa Talamantes  DO based on my observations and the provider's statements to me.     9/24/2018   [facility] EMERGENCY DEPARTMENT       Albin, [place],   09/24/18 0119

## 2019-06-14 ENCOUNTER — HOSPITAL ENCOUNTER (EMERGENCY)
Facility: CLINIC | Age: 39
Discharge: HOME | End: 2019-06-14
Attending: NURSE PRACTITIONER | Admitting: NURSE PRACTITIONER
Payer: COMMERCIAL

## 2019-06-14 ENCOUNTER — APPOINTMENT (OUTPATIENT)
Dept: GENERAL RADIOLOGY | Facility: CLINIC | Age: 39
End: 2019-06-14
Attending: NURSE PRACTITIONER
Payer: COMMERCIAL

## 2019-06-14 VITALS
WEIGHT: 129.41 LBS | HEIGHT: 66 IN | OXYGEN SATURATION: 95 % | TEMPERATURE: 98.1 F | RESPIRATION RATE: 20 BRPM | SYSTOLIC BLOOD PRESSURE: 127 MMHG | BODY MASS INDEX: 20.8 KG/M2 | DIASTOLIC BLOOD PRESSURE: 75 MMHG

## 2019-06-14 DIAGNOSIS — R07.89 CHEST WALL PAIN: ICD-10-CM

## 2019-06-14 LAB — INTERPRETATION ECG - MUSE: NORMAL

## 2019-06-14 PROCEDURE — 99284 EMERGENCY DEPT VISIT MOD MDM: CPT | Mod: 25

## 2019-06-14 PROCEDURE — 93005 ELECTROCARDIOGRAM TRACING: CPT

## 2019-06-14 PROCEDURE — 25000132 ZZH RX MED GY IP 250 OP 250 PS 637: Performed by: NURSE PRACTITIONER

## 2019-06-14 PROCEDURE — 71046 X-RAY EXAM CHEST 2 VIEWS: CPT

## 2019-06-14 RX ORDER — IBUPROFEN 600 MG/1
600 TABLET, FILM COATED ORAL ONCE
Status: COMPLETED | OUTPATIENT
Start: 2019-06-14 | End: 2019-06-14

## 2019-06-14 RX ADMIN — IBUPROFEN 600 MG: 600 TABLET ORAL at 12:53

## 2019-06-14 ASSESSMENT — ENCOUNTER SYMPTOMS
VOMITING: 0
HEMATURIA: 0
DYSURIA: 0
COUGH: 0
ABDOMINAL PAIN: 1
SHORTNESS OF BREATH: 0
FEVER: 0
NAUSEA: 0

## 2019-06-14 ASSESSMENT — MIFFLIN-ST. JEOR: SCORE: 1283.75

## 2019-06-14 NOTE — DISCHARGE INSTRUCTIONS
Use 600 mg ibuprofen every 6-8 hours over the week.  Please see below for reasons to return to emergency department including worsening pain, development of rash, shortness of breath, abdominal pain, fevers or any further concerns.

## 2019-06-14 NOTE — ED PROVIDER NOTES
"  History     Chief Complaint:  Rib pain    HPI   Morelia S Clement is a 38 year old female who presents with right rib pain. The patient reports that last night she started to experience right rib pain. She notes that  worse with movement, position, and pressure on the spot. She states that it worsens with deep breathing. She denies recent injury,  fever, cough, shortness of breath, chest pain, vomiting, urinary symptoms, or leg swelling.    CARDIAC RISK FACTORS:  Sex:    F  Tobacco:   Yes  Hypertension:   No  Hyperlipidemia:  No  Diabetes:   No  Family History:  No    PE/DVT RISK FACTORS:  Sex:    F  Hormones:   No  Tobacco:   Yes  Cancer:   No  Travel:   No  Surgery:   No  Other immobilization: No  Personal history:  No  Family history:  No    Allergies:  No known drug allergies    Medications:    The patient is not currently taking any prescribed medications.    Past Medical History:    Endometriosis  Tobacco abuse  Ovarian cysts    Past Surgical History:    [place] Teeth Extraction    Family History:    Diabetes    Social History:  Smoking status: Yes, 0.5 packs/day  Alcohol use: Yes  Marital Status:  Single [1]    Review of Systems   Constitutional: Negative for fever.   Respiratory: Negative for cough and shortness of breath.    Cardiovascular: Negative for chest pain and leg swelling.   Gastrointestinal: Positive for abdominal pain. Negative for nausea and vomiting.   Genitourinary: Negative for dysuria, hematuria and urgency.   All other systems reviewed and are negative.      Physical Exam     Patient Vitals for the past 24 hrs:   BP Temp Temp src Heart Rate Resp SpO2 Height Weight   06/14/19 1208 127/75 98.1  F (36.7  C) Oral 79 20 95 % 1.676 m (5' 6\") 58.7 kg (129 lb 6.6 oz)     Physical Exam  Constitutional: Alert, attentive, GCS 15.    HENT: Mucous membranes are moist.   Eyes: EOM are normal. Conjunctiva pink, no scleral icterus or conjunctival injection  CV: regular rate and rhythm; no murmurs, rubs or " gallups.  Radial, dorsalis pedis and posterior tibial pulses 2+ bilaterally.  Cap refill <2 seconds.  Respiratory: Effort normal. Lungs clear to auscultation bilaterally. No crackles/rubs/wheezes.  Good air movement.  GI:  There is no tenderness; rebound or guarding. No distension. Normal bowel sounds.  MSK: Reproducible pain over right anterior and lower rib cage. No steps offs or creptius. No rash, erythema or ecchymosis.  Neurological: Alert, attentive.  Skin: Skin is warm and dry.  No rashes or petechiae.  Psychiatric: Normal affect.      Emergency Department Course   ECG (12:30:16):  Rate 72 bpm. WV interval 150. QRS duration 86. QT/QTc 416/455. P-R-T axes 35 119 52. Normal sinus rhythm. Left posterior fascicular block. Abnormal ECG. Interpreted at 1248 by Francie Roman APRN.    Imaging:  Radiographic findings were communicated with the patient who voiced understanding of the findings.  Chest XR, PA & LAT  The lungs are clear. No focal pulmonary opacities. Heart  and mediastinum are unremarkable. No acute cardiopulmonary  Abnormalities.  As read by Radiology.    Interventions:  1253: Ibuprofen 600 mg PO    Emergency Department Course:  Past medical records, nursing notes, and vitals reviewed.  1220: I performed an exam of the patient and obtained history, as documented above.  EKG performed, results above.  The patient was sent for a Chest XR, Pa & LAT while in the emergency department, findings above.    1322: I rechecked the patient. Findings and plan explained to the Patient. Patient discharged home with instructions regarding supportive care, medications, and reasons to return. The importance of close follow-up was reviewed.       Impression & Plan    Medical Decision Making:  Morelia Clement is a 38 year old female who presents for evaluation of right rib pain as detailed above.  A broad differential was considered.  A chest x-ray was obtained without evidence of rib fracture, pneumothorax, pneumonia  or further acute findings.  EKG without evidence of ischemia, pericarditis, underlying arrhythmia or further concerning abnormality.  There is no shortness of breath, tachycardia or hypoxia.  She is low risk for pulmonary embolism by Wells criteria PERC negative.  I did not feel that further work-up for PE was indicated at today's visit.  Further, doubt ACS in this situation and did not feel the troponin was indicated.  There is no abdominal tenderness concerning for intra-abdominal catastrophes including biliary pathology.  History and exam inconsistent with kidney stones.  Her pain is very reproducible over anterior chest wall and at this time I favor musculoskeletal source.  She did have some improvement after ibuprofen.  Discussed possibility of costochondritis versus muscle strain.  I did offer to complete a larger work-up including troponin, d-dimer etc.  However, patient declines as she agrees that this feels muscular skeletal in nature.  At this time I do believe she is appropriate for outpatient management.  She will schedule ibuprofen for the next week.  Add Tylenol as needed for pain.  She will return to emergency department immediately with increasing pain, shortness of breath, fevers or any further concerns.      Diagnosis:    ICD-10-CM   1. Chest wall pain R07.89     Disposition:  discharged to home    Shyam Puentes  6/14/2019   [facility] EMERGENCY DEPARTMENT  I, Shyam Puentes, am serving as a scribe at 12:20 PM on 6/14/2019 to document services personally performed by Francie Roman APRN based on my observations and the provider's statements to me.          Francie Roman APRN CNP  06/14/19 8555

## 2019-06-14 NOTE — ED TRIAGE NOTES
Pt has pain in ribcage on right side which started last evening while at work.  Pain is worse with deep breath.  No known injury.

## 2019-06-14 NOTE — ED AVS SNAPSHOT
[facility] Emergency Department  [address]  [facility] [address]  Phone:  [phone]  Fax:  [phone]                                    Morelia Clement   MRN: [id number]    Department:  [facility] Emergency Department   Date of Visit:  6/14/2019           After Visit Summary Signature Page    I have received my discharge instructions, and my questions have been answered. I have discussed any challenges I see with this plan with the nurse or doctor.    ..........................................................................................................................................  Patient/Patient Representative Signature      ..........................................................................................................................................  Patient Representative Print Name and Relationship to Patient    ..................................................               ................................................  Date                                   Time    ..........................................................................................................................................  Reviewed by Signature/Title    ...................................................              ..............................................  Date                                               Time          22EPIC Rev 08/18

## 2019-07-12 ENCOUNTER — HOSPITAL PATHOLOGY (OUTPATIENT)
Dept: OTHER | Facility: CLINIC | Age: 39
End: 2019-07-12

## 2019-07-15 LAB — COPATH REPORT: NORMAL

## 2020-11-08 ENCOUNTER — APPOINTMENT (OUTPATIENT)
Dept: GENERAL RADIOLOGY | Facility: CLINIC | Age: 40
End: 2020-11-08
Attending: EMERGENCY MEDICINE
Payer: COMMERCIAL

## 2020-11-08 ENCOUNTER — HOSPITAL ENCOUNTER (EMERGENCY)
Facility: CLINIC | Age: 40
Discharge: HOME | End: 2020-11-08
Attending: EMERGENCY MEDICINE | Admitting: EMERGENCY MEDICINE
Payer: COMMERCIAL

## 2020-11-08 VITALS
HEART RATE: 81 BPM | TEMPERATURE: 98.1 F | OXYGEN SATURATION: 99 % | DIASTOLIC BLOOD PRESSURE: 88 MMHG | RESPIRATION RATE: 16 BRPM | BODY MASS INDEX: 24.41 KG/M2 | WEIGHT: 151.24 LBS | SYSTOLIC BLOOD PRESSURE: 130 MMHG

## 2020-11-08 DIAGNOSIS — M25.532 LEFT WRIST PAIN: ICD-10-CM

## 2020-11-08 PROCEDURE — 99283 EMERGENCY DEPT VISIT LOW MDM: CPT

## 2020-11-08 PROCEDURE — 73110 X-RAY EXAM OF WRIST: CPT | Mod: LT

## 2020-11-08 NOTE — ED PROVIDER NOTES
History     Chief Complaint:  Wrist Pain      [facility]   Morelia Clement is a 39 year old female smoker who presents after one month of left wrist pain over the ulnar region that is exacerbated by extension of the wrist but not flexion. She denied any trauma or injury prior to onset or any swelling or redness. 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 She has tried icing her wrist but as her pain has not improved she decided to present to the ED for evaluation. She has no history of surgery to this wrist.     Allergies:  The patient has no known drug allergies.    Medications:    The patient is currently on no regular medications.     Past Medical History:    Endometriosis     Past Surgical History:    [place] teeth extraction     Family History:    Diabetes    Social History:  Tobacco use: Current every day smoker   Alcohol use: Yes  Marital Status:  Single [1]    Review of Systems  Please see HPI. All other systems reviewed and negative.     Physical Exam     Patient Vitals for the past 24 hrs:   BP Temp Temp src Pulse Resp SpO2 Weight   11/08/20 0909 130/88 98.1  F (36.7  C) Temporal 81 16 99 % 68.6 kg (151 lb 3.8 oz)       Physical Exam  General- alert, cooperative  Pulm- normal respiratory effort, no respiratory distress  Msk- RUE: 2+ pulses, sensation to light touch intact, no wounds or abrasions   ROM normal without difficulty, 5/5 strength           LUE: 2+ pulses, sensation to light touch intact, no wounds or abrasions   ROM normal without difficulty, 5/5 strength. TTP over left ulnar styloid, no redness or swelling on exam  Skin- no cyanosis or edema, no swelling, no rash or petechiae  Psych- normal mood and affect, normal behavior                Emergency Department Course   Imaging:  Radiographic findings were communicated with the patient who voiced understanding of the findings.    XR Wrist 3 views, Left:   Normal joint spaces and alignment. No fracture, as per radiology.      Emergency Department Course:  Nursing notes and vitals reviewed. (0922) I performed an exam of the patient as documented above.     The patient was sent for a wrist XR while in the emergency department, findings above.     (1010) I rechecked the patient and discussed the results of her workup thus far.     Findings and plan explained to the Patient. Patient discharged home with instructions regarding supportive care, medications, and reasons to return. The importance of close follow-up was reviewed.     I personally reviewed the laboratory results with the Patient and answered all related questions prior to discharge.     Impression & Plan      Medical Decision Making:  Morelia Clement is a 39 year old female presents for evaluation of left wrist pain.  Signs and symptoms are consistent with a wrist sprain.  A broad differential was considered including sprain, strain, fracture, tendon rupture, nerve impingement/compromise, referred pain. Supportive outpatient management is indicated.  Rest, ice, and elevation treatment was discussed with the patient. The patients head to toe trauma exam is otherwise negative for serious underlying disease of the head, neck, chest, abdomen, extremities, pelvis. Close follow-up with patient's primary care physician per discharge precautions. Contusion discharge instructions given for home.     Diagnosis:    ICD-10-CM    1. Left wrist pain  M25.532        Disposition:  discharged to home    Scribe Disclosure:  I, Belia Montgomery, am serving as a scribe on 11/8/2020 at 10:13 AM to personally document services performed by Pamela Weir MD based on my observations and the provider's statements to me.     Belia Montgomery  11/8/2020   [facility] EMERGENCY DEPT       Pamela Weir MD  11/08/20 9541

## 2020-11-08 NOTE — ED TRIAGE NOTES
Pt presents to ED for evaluation of left wrist pain that has been constant for the past month.  No known injury.  Pain is worse when putting pressure on the hand.

## 2020-11-08 NOTE — ED AVS SNAPSHOT
[facility] Emergency Dept  [address]  [facility] [address]  Phone: [phone]  Fax: [phone]                                    Morelia Clement   MRN: [id number]    Department: [facility] Emergency Dept   Date of Visit: 11/8/2020           After Visit Summary Signature Page    I have received my discharge instructions, and my questions have been answered. I have discussed any challenges I see with this plan with the nurse or doctor.    ..........................................................................................................................................  Patient/Patient Representative Signature      ..........................................................................................................................................  Patient Representative Print Name and Relationship to Patient    ..................................................               ................................................  Date                                   Time    ..........................................................................................................................................  Reviewed by Signature/Title    ...................................................              ..............................................  Date                                               Time          22EPIC Rev 08/18

## 2022-07-10 ENCOUNTER — HOSPITAL ENCOUNTER (EMERGENCY)
Facility: OTHER | Age: 42
Discharge: HOME | End: 2022-07-10
Attending: EMERGENCY MEDICINE

## 2022-07-10 VITALS
HEIGHT: 66 IN | HEART RATE: 89 BPM | OXYGEN SATURATION: 98 % | BODY MASS INDEX: 24.11 KG/M2 | TEMPERATURE: 98 F | DIASTOLIC BLOOD PRESSURE: 61 MMHG | RESPIRATION RATE: 20 BRPM | WEIGHT: 150 LBS | SYSTOLIC BLOOD PRESSURE: 110 MMHG

## 2022-07-10 DIAGNOSIS — R11.2 NON-INTRACTABLE VOMITING WITH NAUSEA, UNSPECIFIED VOMITING TYPE: ICD-10-CM

## 2022-07-10 DIAGNOSIS — R10.13 EPIGASTRIC PAIN: Primary | ICD-10-CM

## 2022-07-10 LAB
ALBUMIN SERPL BCP-MCNC: 4.1 G/DL (ref 3.5–5.2)
ALP SERPL-CCNC: 72 U/L (ref 55–135)
ALT SERPL W/O P-5'-P-CCNC: 72 U/L (ref 10–44)
ANION GAP SERPL CALC-SCNC: 11 MMOL/L (ref 8–16)
AST SERPL-CCNC: 153 U/L (ref 10–40)
BASOPHILS # BLD AUTO: 0.03 K/UL (ref 0–0.2)
BASOPHILS NFR BLD: 0.3 % (ref 0–1.9)
BILIRUB SERPL-MCNC: 1.1 MG/DL (ref 0.1–1)
BILIRUB UR QL STRIP: NEGATIVE
BUN SERPL-MCNC: 6 MG/DL (ref 6–20)
CALCIUM SERPL-MCNC: 8.9 MG/DL (ref 8.7–10.5)
CHLORIDE SERPL-SCNC: 109 MMOL/L (ref 95–110)
CLARITY UR: CLEAR
CO2 SERPL-SCNC: 21 MMOL/L (ref 23–29)
COLOR UR: YELLOW
CREAT SERPL-MCNC: 0.7 MG/DL (ref 0.5–1.4)
DIFFERENTIAL METHOD: ABNORMAL
EOSINOPHIL # BLD AUTO: 0 K/UL (ref 0–0.5)
EOSINOPHIL NFR BLD: 0 % (ref 0–8)
ERYTHROCYTE [DISTWIDTH] IN BLOOD BY AUTOMATED COUNT: 13.2 % (ref 11.5–14.5)
EST. GFR  (AFRICAN AMERICAN): >60 ML/MIN/1.73 M^2
EST. GFR  (NON AFRICAN AMERICAN): >60 ML/MIN/1.73 M^2
GLUCOSE SERPL-MCNC: 125 MG/DL (ref 70–110)
GLUCOSE UR QL STRIP: NEGATIVE
HCT VFR BLD AUTO: 40.8 % (ref 37–48.5)
HCV AB SERPL QL IA: NEGATIVE
HGB BLD-MCNC: 14 G/DL (ref 12–16)
HGB UR QL STRIP: NEGATIVE
HIV 1+2 AB+HIV1 P24 AG SERPL QL IA: NEGATIVE
IMM GRANULOCYTES # BLD AUTO: 0.02 K/UL (ref 0–0.04)
IMM GRANULOCYTES NFR BLD AUTO: 0.2 % (ref 0–0.5)
KETONES UR QL STRIP: NEGATIVE
LEUKOCYTE ESTERASE UR QL STRIP: NEGATIVE
LIPASE SERPL-CCNC: 49 U/L (ref 4–60)
LYMPHOCYTES # BLD AUTO: 2.3 K/UL (ref 1–4.8)
LYMPHOCYTES NFR BLD: 25 % (ref 18–48)
MCH RBC QN AUTO: 31.5 PG (ref 27–31)
MCHC RBC AUTO-ENTMCNC: 34.3 G/DL (ref 32–36)
MCV RBC AUTO: 92 FL (ref 82–98)
MONOCYTES # BLD AUTO: 0.9 K/UL (ref 0.3–1)
MONOCYTES NFR BLD: 9.9 % (ref 4–15)
NEUTROPHILS # BLD AUTO: 6 K/UL (ref 1.8–7.7)
NEUTROPHILS NFR BLD: 64.6 % (ref 38–73)
NITRITE UR QL STRIP: NEGATIVE
NRBC BLD-RTO: 0 /100 WBC
PH UR STRIP: 6 [PH] (ref 5–8)
PLATELET # BLD AUTO: 235 K/UL (ref 150–450)
PMV BLD AUTO: 10.5 FL (ref 9.2–12.9)
POTASSIUM SERPL-SCNC: 3.9 MMOL/L (ref 3.5–5.1)
PROT SERPL-MCNC: 7.5 G/DL (ref 6–8.4)
PROT UR QL STRIP: NEGATIVE
RBC # BLD AUTO: 4.44 M/UL (ref 4–5.4)
SODIUM SERPL-SCNC: 141 MMOL/L (ref 136–145)
SP GR UR STRIP: 1.01 (ref 1–1.03)
URN SPEC COLLECT METH UR: NORMAL
UROBILINOGEN UR STRIP-ACNC: NEGATIVE EU/DL
WBC # BLD AUTO: 9.36 K/UL (ref 3.9–12.7)

## 2022-07-10 PROCEDURE — 63600175 PHARM REV CODE 636 W HCPCS: Performed by: EMERGENCY MEDICINE

## 2022-07-10 PROCEDURE — 80053 COMPREHEN METABOLIC PANEL: CPT | Performed by: EMERGENCY MEDICINE

## 2022-07-10 PROCEDURE — 25000003 PHARM REV CODE 250: Performed by: EMERGENCY MEDICINE

## 2022-07-10 PROCEDURE — 83690 ASSAY OF LIPASE: CPT | Performed by: EMERGENCY MEDICINE

## 2022-07-10 PROCEDURE — 81003 URINALYSIS AUTO W/O SCOPE: CPT | Performed by: EMERGENCY MEDICINE

## 2022-07-10 PROCEDURE — 96375 TX/PRO/DX INJ NEW DRUG ADDON: CPT

## 2022-07-10 PROCEDURE — 96361 HYDRATE IV INFUSION ADD-ON: CPT

## 2022-07-10 PROCEDURE — 85025 COMPLETE CBC W/AUTO DIFF WBC: CPT | Performed by: EMERGENCY MEDICINE

## 2022-07-10 PROCEDURE — 96374 THER/PROPH/DIAG INJ IV PUSH: CPT

## 2022-07-10 PROCEDURE — 99285 EMERGENCY DEPT VISIT HI MDM: CPT | Mod: 25

## 2022-07-10 PROCEDURE — 86803 HEPATITIS C AB TEST: CPT | Performed by: EMERGENCY MEDICINE

## 2022-07-10 PROCEDURE — 87389 HIV-1 AG W/HIV-1&-2 AB AG IA: CPT | Performed by: EMERGENCY MEDICINE

## 2022-07-10 RX ORDER — ONDANSETRON 2 MG/ML
4 INJECTION INTRAMUSCULAR; INTRAVENOUS
Status: COMPLETED | OUTPATIENT
Start: 2022-07-10 | End: 2022-07-10

## 2022-07-10 RX ORDER — PANTOPRAZOLE SODIUM 20 MG/1
20 TABLET, DELAYED RELEASE ORAL DAILY
Qty: 30 TABLET | Refills: 0 | Status: SHIPPED | OUTPATIENT
Start: 2022-07-10 | End: 2022-08-30

## 2022-07-10 RX ORDER — ONDANSETRON 4 MG/1
4 TABLET, ORALLY DISINTEGRATING ORAL EVERY 8 HOURS PRN
Qty: 15 TABLET | Refills: 0 | Status: SHIPPED | OUTPATIENT
Start: 2022-07-10 | End: 2022-08-30

## 2022-07-10 RX ORDER — FAMOTIDINE 20 MG/1
20 TABLET, FILM COATED ORAL
Status: COMPLETED | OUTPATIENT
Start: 2022-07-10 | End: 2022-07-10

## 2022-07-10 RX ORDER — MORPHINE SULFATE 4 MG/ML
4 INJECTION, SOLUTION INTRAMUSCULAR; INTRAVENOUS
Status: COMPLETED | OUTPATIENT
Start: 2022-07-10 | End: 2022-07-10

## 2022-07-10 RX ADMIN — SODIUM CHLORIDE 1000 ML: 0.9 INJECTION, SOLUTION INTRAVENOUS at 10:07

## 2022-07-10 RX ADMIN — FAMOTIDINE 20 MG: 20 TABLET ORAL at 02:07

## 2022-07-10 RX ADMIN — MORPHINE SULFATE 4 MG: 4 INJECTION, SOLUTION INTRAMUSCULAR; INTRAVENOUS at 10:07

## 2022-07-10 RX ADMIN — ONDANSETRON 4 MG: 2 INJECTION INTRAMUSCULAR; INTRAVENOUS at 10:07

## 2022-07-10 NOTE — ED PROVIDER NOTES
Encounter Date: 7/10/2022    SCRIBE #1 NOTE: I, Nadia Botello, am scribing for, and in the presence of,  Randy Whipple MD. I have scribed the following portions of the note - Other sections scribed: HPI, ROS, PE.       History     Chief Complaint   Patient presents with    Abdominal Pain    Vomiting    Nausea     Mid upper abd pain 8/10, n/v x 1 wk. Vomited twice yesterday bright red emesis x 2's. Denies PMH. VSS.      Time seen by provider: 10:12 AM    This is a 41 y.o. female who presents with complaint of mid upper abdominal pain x 10 days and emesis with onset 1 day ago. She reports that she has had sharp and burning abdominal pain that began almost 2 weeks ago while she was sitting down. She thought it was indigestion because eating made the pain worse. She states that she took TUMS today and felt minor relief. She had onset of nausea and emesis last night, which she states it looked pinkish, and emesis was clear today. She previously had diarrhea, but has been constipated more often recently. Patient reports to have a hx of celiac disease, but denies a hx of similar symptoms with it before. According to her , the patient had an ulcer 10 years ago, but she presented with less severe symptoms. Patient denies fever, cough, and bloody stool. She reports that she smokes and drinks socially. She adds that she had her first drink, after 4 days, last night.    The history is provided by the patient and the spouse.     Review of patient's allergies indicates:   Allergen Reactions    Gluten protein Anaphylaxis     History reviewed. No pertinent past medical history.  History reviewed. No pertinent surgical history.  History reviewed. No pertinent family history.  Social History     Tobacco Use    Smoking status: Current Every Day Smoker     Types: Vaping with nicotine    Smokeless tobacco: Never Used   Substance Use Topics    Alcohol use: Yes     Comment: twice a week, 1 drink last night    Drug  use: Never     Review of Systems   Constitutional: Negative for fever.   HENT: Negative for congestion.    Eyes: Negative for redness.   Respiratory: Negative for cough and shortness of breath.    Cardiovascular: Negative for chest pain.   Gastrointestinal: Positive for abdominal pain, constipation, nausea and vomiting. Negative for blood in stool.   Genitourinary: Negative for dysuria.   Skin: Negative for rash.   Neurological: Negative for headaches.   Psychiatric/Behavioral: Negative for confusion.       Physical Exam     Initial Vitals [07/10/22 0854]   BP Pulse Resp Temp SpO2   116/70 91 18 97.7 °F (36.5 °C) 98 %      MAP       --         Physical Exam    Nursing note and vitals reviewed.  Constitutional: She appears well-developed and well-nourished. She is not diaphoretic. She appears distressed.   Mild distress.    HENT:   Head: Normocephalic and atraumatic.   Eyes: Conjunctivae are normal. No scleral icterus.   Neck: Neck supple.   Cardiovascular: Normal rate, regular rhythm, normal heart sounds and intact distal pulses.   No murmur heard.  Pulmonary/Chest: Breath sounds normal. No respiratory distress. She has no wheezes. She has no rhonchi. She has no rales.   Abdominal: Abdomen is soft. There is abdominal tenderness.   Diffuse epigastric tenderness. There is no rebound and no guarding.   Musculoskeletal:         General: No edema.      Cervical back: Neck supple.     Neurological: She is alert and oriented to person, place, and time.   Skin: Skin is warm and dry.   Psychiatric: She has a normal mood and affect.         ED Course   Procedures  Labs Reviewed   CBC W/ AUTO DIFFERENTIAL - Abnormal; Notable for the following components:       Result Value    MCH 31.5 (*)     All other components within normal limits   COMPREHENSIVE METABOLIC PANEL - Abnormal; Notable for the following components:    CO2 21 (*)     Glucose 125 (*)     Total Bilirubin 1.1 (*)      (*)     ALT 72 (*)     All other  components within normal limits   LIPASE   URINALYSIS, REFLEX TO URINE CULTURE    Narrative:     Specimen Source->Urine   HIV 1 / 2 ANTIBODY    Narrative:     Release to patient->Immediate   HEPATITIS C ANTIBODY    Narrative:     Release to patient->Immediate          Imaging Results          US Abdomen Limited (Final result)  Result time 07/10/22 12:18:38    Final result by Dixie Jaeger MD (07/10/22 12:18:38)                 Impression:      Cholelithiasis with no additional sonographic findings to strongly suggest acute cholecystitis.      Electronically signed by: Dixie Jaeger MD  Date:    07/10/2022  Time:    12:18             Narrative:    EXAMINATION:  US ABDOMEN LIMITED    CLINICAL HISTORY:  Epigastric pain;    TECHNIQUE:  Limited ultrasound of the right upper quadrant of the abdomen (including pancreas, liver, gallbladder, common bile duct, and spleen) was performed.    COMPARISON:  None.    FINDINGS:  Liver: Normal in size, measuring 14.5 cm. Homogeneous echotexture. No focal hepatic lesions.    Gallbladder: There are multiple mobile gallstones with the largest measuring 3 mm.  No gallbladder wall thickening or hypervascularity, pericholecystic fluid, or sonographic Kennedy sign.    Biliary system: The common duct is upper normal, measuring 6 mm.  No intrahepatic ductal dilatation.    Spleen: Upper normal in size and echotexture, measuring 11.9 cm.    Miscellaneous: No upper abdominal ascites.                                 Medications   sodium chloride 0.9% bolus 1,000 mL (0 mLs Intravenous Stopped 7/10/22 1123)   ondansetron injection 4 mg (4 mg Intravenous Given 7/10/22 1017)   morphine injection 4 mg (4 mg Intravenous Given 7/10/22 1018)   famotidine tablet 20 mg (20 mg Oral Given 7/10/22 1408)     Medical Decision Making:   History:   Old Medical Records: I decided to obtain old medical records.  Initial Assessment:       41-year-old female with history of celiac disease presents for  evaluation of vomiting since last night.  Patient has had upper abdominal pain for about 10 days, intermittent and worse after meals, denies any history of similar previous episodes with celiac disease.  She also has had alternating loose stools and constipation which is consistent with her celiac, no blood in stool.  Yesterday she started having nausea with emesis of pinkish liquid.  Today pain persisted and she had another episode of emesis of clear liquid.  Patient admits to drinking regularly and smoking cigarettes, has relatively bland diet due to celiac.  No medication or NSAID use.  On arrival patient in mild distress due to pain and nausea, with nonbloody emesis noted in emesis bag.  She does have diffuse epigastric tenderness, no acute abdomen or other concerning exam findings.  Differential includes gastritis, she states she has distant ulcer history but pain was less severe then. Given her regular use of alcohol, would consider pancreatitis as well.  No sign of active upper GI bleed, she denies any melena.  Biliary colic or acute cholecystitis less likely.  Will check basic labs and get abdominal ultrasound.      Basic labs with normal WBC and no anemia, no elevated lipase and normal electrolytes and renal function.  LFTs slightly elevated with total bilirubin 1.1,  and ALT 72, ratio more consistent with alcohol abuse, less likely obstructive liver disease.  Ultrasound does show gallstones but no sign cholecystitis, and no biliary dilation.  After pain control and Zofran, patient feels much better and is tolerating liquids without difficulty.  I discussed with her possibilities of gastritis or PUD, versus biliary colic less likely.  I offered her admission with surgery and GI consults, versus discharge with close follow-up.  She prefers discharge, will start PPI and bland diet, and she was also advised on smoking and alcohol cessation.  She is advised to follow-up with GI for possible EGD for  further evaluation, and surgery consult if she has no improvement with this regimen to evaluate for potential biliary colic.  She understands to return to the ED for any worsening pain, recurrent vomiting, or any other concerns.      Clinical Tests:   Lab Tests: Ordered and Reviewed  Radiological Study: Ordered and Reviewed          Scribe Attestation:   Scribe #1: I performed the above scribed service and the documentation accurately describes the services I performed. I attest to the accuracy of the note.                I, Dr. Randy Whipple, personally performed the services described in this documentation. All medical record entries made by the scribe were at my direction and in my presence.  I have reviewed the chart and agree that the record reflects my personal performance and is accurate and complete. Randy Whipple MD.  9:19 PM 07/10/2022        Clinical Impression:   Final diagnoses:  [R10.13] Epigastric pain (Primary)  [R11.2] Non-intractable vomiting with nausea, unspecified vomiting type          ED Disposition Condition    Discharge Stable        ED Prescriptions     Medication Sig Dispense Start Date End Date Auth. Provider    pantoprazole (PROTONIX) 20 MG tablet Take 1 tablet (20 mg total) by mouth once daily. 30 tablet 7/10/2022 7/10/2023 Randy Whipple MD    ondansetron (ZOFRAN-ODT) 4 MG TbDL Take 1 tablet (4 mg total) by mouth every 8 (eight) hours as needed (Nausea). 15 tablet 7/10/2022  Randy Whiplpe MD        Follow-up Information     Follow up With Specialties Details Why Contact Info Additional Information    [facility] GASTROENTEROLOGY Gastroenterology Schedule an appointment as soon as possible for a visit in 1 week  [address] [facility] [id number]  [phone]     [facility] Emergency Dept Emergency Medicine Go to  If symptoms worsen [address] [facility] [id number] [phone]     [facility] Internal Medicine Internal Medicine  Schedule an appointment as soon as possible for a visit in 1 week For primary care doctor [address]  [facility] [id number] [phone] Internal Medicine - [facility], 8th Floor, [address] Please park in [place] and use [place] elevators           Randy Whipple MD  07/10/22 5431

## 2022-07-10 NOTE — ED TRIAGE NOTES
42 y/o female presents to ED with abd pain starting 1.5 weeks ago, nausea/vomiting starting yesterday. Denies recent fevers.

## 2022-07-21 ENCOUNTER — OFFICE VISIT (OUTPATIENT)
Dept: INTERNAL MEDICINE | Facility: CLINIC | Age: 42
End: 2022-07-21

## 2022-07-21 ENCOUNTER — LAB VISIT (OUTPATIENT)
Dept: LAB | Facility: HOSPITAL | Age: 42
End: 2022-07-21

## 2022-07-21 VITALS
HEART RATE: 89 BPM | DIASTOLIC BLOOD PRESSURE: 70 MMHG | OXYGEN SATURATION: 98 % | HEIGHT: 66 IN | BODY MASS INDEX: 23.57 KG/M2 | SYSTOLIC BLOOD PRESSURE: 92 MMHG | WEIGHT: 146.63 LBS

## 2022-07-21 DIAGNOSIS — Z76.89 ENCOUNTER TO ESTABLISH CARE WITH NEW DOCTOR: Primary | ICD-10-CM

## 2022-07-21 DIAGNOSIS — K59.00 CONSTIPATION, UNSPECIFIED CONSTIPATION TYPE: ICD-10-CM

## 2022-07-21 DIAGNOSIS — R10.13 EPIGASTRIC PAIN: ICD-10-CM

## 2022-07-21 DIAGNOSIS — R79.89 ELEVATED LFTS: ICD-10-CM

## 2022-07-21 LAB
ALBUMIN SERPL BCP-MCNC: 4.1 G/DL (ref 3.5–5.2)
ALP SERPL-CCNC: 60 U/L (ref 55–135)
ALT SERPL W/O P-5'-P-CCNC: 19 U/L (ref 10–44)
AST SERPL-CCNC: 14 U/L (ref 10–40)
BILIRUB DIRECT SERPL-MCNC: 0.2 MG/DL (ref 0.1–0.3)
BILIRUB SERPL-MCNC: 0.5 MG/DL (ref 0.1–1)
PROT SERPL-MCNC: 6.8 G/DL (ref 6–8.4)

## 2022-07-21 PROCEDURE — 99999 PR PBB SHADOW E&M-EST. PATIENT-LVL IV: CPT | Mod: PBBFAC,,, | Performed by: STUDENT IN AN ORGANIZED HEALTH CARE EDUCATION/TRAINING PROGRAM

## 2022-07-21 PROCEDURE — 36415 COLL VENOUS BLD VENIPUNCTURE: CPT | Performed by: STUDENT IN AN ORGANIZED HEALTH CARE EDUCATION/TRAINING PROGRAM

## 2022-07-21 PROCEDURE — 99214 OFFICE O/P EST MOD 30 MIN: CPT | Mod: PBBFAC | Performed by: STUDENT IN AN ORGANIZED HEALTH CARE EDUCATION/TRAINING PROGRAM

## 2022-07-21 PROCEDURE — 99203 OFFICE O/P NEW LOW 30 MIN: CPT | Mod: S$PBB,,, | Performed by: STUDENT IN AN ORGANIZED HEALTH CARE EDUCATION/TRAINING PROGRAM

## 2022-07-21 PROCEDURE — 99999 PR PBB SHADOW E&M-EST. PATIENT-LVL IV: ICD-10-PCS | Mod: PBBFAC,,, | Performed by: STUDENT IN AN ORGANIZED HEALTH CARE EDUCATION/TRAINING PROGRAM

## 2022-07-21 PROCEDURE — 99203 PR OFFICE/OUTPT VISIT, NEW, LEVL III, 30-44 MIN: ICD-10-PCS | Mod: S$PBB,,, | Performed by: STUDENT IN AN ORGANIZED HEALTH CARE EDUCATION/TRAINING PROGRAM

## 2022-07-21 PROCEDURE — 80076 HEPATIC FUNCTION PANEL: CPT | Performed by: STUDENT IN AN ORGANIZED HEALTH CARE EDUCATION/TRAINING PROGRAM

## 2022-07-21 NOTE — PROGRESS NOTES
Internal Medicine Clinic Note  2022    Subjective   Patient Name: Mikayla Ray  : 1980    Chief Complaint:   Chief Complaint   Patient presents with    Follow-up       History of Present Illness:  Ms. Mikayla Ray is a 41 y.o. female with PMH of ETOH and tobacco use here for emergency room follow up. She was seen on 7/10 due to a 2 week hx of post-prandial epigastric pain. She described the pain as burning/stabbing without radiation to her back, and was triggered by all foods. The pain began to wake her from sleep, and she started having episodes of emesis with retching. At the ED, her vitals were stable and workup revealed normal lipase, cbc. CMP with TB 1.1 and elevated LFTs. US showed cholelithiasis. She was sent home with GI fu and daily protonix. Since then, she has been doing better while taking the PPI and following a bland diet. She previously was drinking around 10 drinks/week however since the ED has not had any ETOH. She denies fevers, chills, melena, emesis, chest pain, or dysuria. She has had some constipation since the ED with only two bowel movements, but is passing gas. She reports feeling bloated and occasionally has cramping. She does not have insurance currently.     Review of Systems   Constitutional: Negative for chills, fever, malaise/fatigue and weight loss.   Respiratory: Negative for cough and shortness of breath.    Cardiovascular: Negative for chest pain.   Gastrointestinal: Positive for abdominal pain and constipation. Negative for blood in stool, heartburn, nausea and vomiting.   Genitourinary: Negative for dysuria.   Musculoskeletal: Negative for myalgias.   Skin: Negative for rash.   Neurological: Negative for headaches.       PAST HISTORY:     Past Medical History:   Diagnosis Date    GERD (gastroesophageal reflux disease)     Gluten intolerance        Past Surgical History:   Procedure Laterality Date    TUBAL LIGATION      WISDOM TOOTH EXTRACTION         Family  "History   Problem Relation Age of Onset    Hypertension Mother     Diabetes Mother          MEDICATIONS & ALLERGIES:       Current Outpatient Medications:     ondansetron (ZOFRAN-ODT) 4 MG TbDL, Take 1 tablet (4 mg total) by mouth every 8 (eight) hours as needed (Nausea)., Disp: 15 tablet, Rfl: 0    pantoprazole (PROTONIX) 20 MG tablet, Take 1 tablet (20 mg total) by mouth once daily., Disp: 30 tablet, Rfl: 0    Review of patient's allergies indicates:   Allergen Reactions    Gluten protein Anaphylaxis       OBJECTIVE:     Vital Signs:  Vitals:    07/21/22 1537   BP: 92/70   BP Location: Left arm   Patient Position: Sitting   BP Method: Medium (Manual)   Pulse: 89   SpO2: 98%   Weight: 66.5 kg (146 lb 9.7 oz)   Height: 5' 6" (1.676 m)       Body mass index is 23.66 kg/m².     Physical Exam  Constitutional:       General: She is not in acute distress.     Appearance: She is not ill-appearing.   HENT:      Head: Normocephalic and atraumatic.      Mouth/Throat:      Mouth: Mucous membranes are moist.   Eyes:      Extraocular Movements: Extraocular movements intact.      Pupils: Pupils are equal, round, and reactive to light.   Cardiovascular:      Rate and Rhythm: Normal rate and regular rhythm.      Heart sounds: No murmur heard.  Pulmonary:      Effort: Pulmonary effort is normal.      Breath sounds: Normal breath sounds.   Abdominal:      General: Abdomen is flat. Bowel sounds are normal. There is no distension.      Palpations: Abdomen is soft.      Tenderness: There is no guarding or rebound.   Musculoskeletal:      Right lower leg: No edema.      Left lower leg: No edema.   Skin:     General: Skin is warm.      Capillary Refill: Capillary refill takes less than 2 seconds.         Laboratory  Recent Results (from the past 336 hour(s))   CBC auto differential    Collection Time: 07/10/22  9:18 AM   Result Value Ref Range    WBC 9.36 3.90 - 12.70 K/uL    RBC 4.44 4.00 - 5.40 M/uL    Hemoglobin 14.0 12.0 - 16.0 " g/dL    Hematocrit 40.8 37.0 - 48.5 %    MCV 92 82 - 98 fL    MCH 31.5 (H) 27.0 - 31.0 pg    MCHC 34.3 32.0 - 36.0 g/dL    RDW 13.2 11.5 - 14.5 %    Platelets 235 150 - 450 K/uL    MPV 10.5 9.2 - 12.9 fL    Immature Granulocytes 0.2 0.0 - 0.5 %    Gran # (ANC) 6.0 1.8 - 7.7 K/uL    Immature Grans (Abs) 0.02 0.00 - 0.04 K/uL    Lymph # 2.3 1.0 - 4.8 K/uL    Mono # 0.9 0.3 - 1.0 K/uL    Eos # 0.0 0.0 - 0.5 K/uL    Baso # 0.03 0.00 - 0.20 K/uL    nRBC 0 0 /100 WBC    Gran % 64.6 38.0 - 73.0 %    Lymph % 25.0 18.0 - 48.0 %    Mono % 9.9 4.0 - 15.0 %    Eosinophil % 0.0 0.0 - 8.0 %    Basophil % 0.3 0.0 - 1.9 %    Differential Method Automated    Lipase    Collection Time: 07/10/22  9:18 AM   Result Value Ref Range    Lipase 49 4 - 60 U/L   Comprehensive metabolic panel    Collection Time: 07/10/22  9:18 AM   Result Value Ref Range    Sodium 141 136 - 145 mmol/L    Potassium 3.9 3.5 - 5.1 mmol/L    Chloride 109 95 - 110 mmol/L    CO2 21 (L) 23 - 29 mmol/L    Glucose 125 (H) 70 - 110 mg/dL    BUN 6 6 - 20 mg/dL    Creatinine 0.7 0.5 - 1.4 mg/dL    Calcium 8.9 8.7 - 10.5 mg/dL    Total Protein 7.5 6.0 - 8.4 g/dL    Albumin 4.1 3.5 - 5.2 g/dL    Total Bilirubin 1.1 (H) 0.1 - 1.0 mg/dL    Alkaline Phosphatase 72 55 - 135 U/L     (H) 10 - 40 U/L    ALT 72 (H) 10 - 44 U/L    Anion Gap 11 8 - 16 mmol/L    eGFR if African American >60 >60 mL/min/1.73 m^2    eGFR if non African American >60 >60 mL/min/1.73 m^2   Urinalysis, Reflex to Urine Culture Urine, Clean Catch    Collection Time: 07/10/22  9:26 AM    Specimen: Urine   Result Value Ref Range    Specimen UA Urine, Clean Catch     Color, UA Yellow Yellow, Straw, [place]orena    Appearance, UA Clear Clear    pH, UA 6.0 5.0 - 8.0    Specific Gravity, UA 1.015 1.005 - 1.030    Protein, UA Negative Negative    Glucose, UA Negative Negative    Ketones, UA Negative Negative    Bilirubin (UA) Negative Negative    Occult Blood UA Negative Negative    Nitrite, UA Negative Negative     Urobilinogen, UA Negative <2.0 EU/dL    Leukocytes, UA Negative Negative   HIV 1/2 Ag/Ab (4th Gen)    Collection Time: 07/10/22 11:29 AM   Result Value Ref Range    HIV 1/2 Ag/Ab Negative Negative   Hepatitis C Antibody    Collection Time: 07/10/22 11:29 AM   Result Value Ref Range    Hepatitis C Ab Negative Negative        Health Maintenance Due   Topic Date Due    Cervical Cancer Screening  Never done    Lipid Panel  Never done    COVID-19 Vaccine (1) Never done    Pneumococcal Vaccines (Age 0-64) (1 - PCV) Never done    TETANUS VACCINE  Never done    Mammogram  Never done       ASSESSMENT & PLAN:       Encounter to establish care with new doctor  Constipation, unspecified constipation type  Epigastric pain  Here to establish care and follow up from the ED for epigastric pain. Pain could be 2/2 to PUD vs gastritis vs biliary colic. Now having constipation. Hx of drinking >10 drinks/week.  - continue PPI 20 mg qd  - fu with GI (scheduled already)  - continue w/ bland diet  - counseled on ETOH, nsaids cessation  - rec mag citrate followed by miralax daily for constipation along w/ adequate hydration      Elevated LFTs  CMP in the ED with TB 1.1, , ALT 72. Likely 2/2 to alcoholic steatosis. US without acute findings in the liver.  - decrease ETOH (<7 drinks/week)  - Hepatic Function Panel; Future; Expected date: 07/21/2022        Preventative Health:   - she defers mammogram, obgyn referral for cervical cancer screen, and vaccines due to lack of insurance    RTC in 6 mo    Discussed with Dr. Ngo, DO  Internal Medicine PGY3    I have discussed A/P with Dr Malloy and agree with plan of action.  Eddie Portillo.

## 2022-07-21 NOTE — PATIENT INSTRUCTIONS
-- continue protonix daily  -- follow up with GI in clinic  -- recommend hepatic function panel for elevated LFTs  -- for constipation - try mag citrate followed by miralax daily

## 2022-08-30 ENCOUNTER — OFFICE VISIT (OUTPATIENT)
Dept: GASTROENTEROLOGY | Facility: CLINIC | Age: 42
End: 2022-08-30

## 2022-08-30 VITALS — WEIGHT: 143.5 LBS | HEIGHT: 66 IN | BODY MASS INDEX: 23.06 KG/M2

## 2022-08-30 DIAGNOSIS — K80.20 GALLSTONES: Primary | ICD-10-CM

## 2022-08-30 DIAGNOSIS — R10.10 PAIN OF UPPER ABDOMEN: ICD-10-CM

## 2022-08-30 PROCEDURE — 99999 PR PBB SHADOW E&M-EST. PATIENT-LVL III: ICD-10-PCS | Mod: PBBFAC,,, | Performed by: NURSE PRACTITIONER

## 2022-08-30 PROCEDURE — 99203 OFFICE O/P NEW LOW 30 MIN: CPT | Mod: S$PBB,,, | Performed by: NURSE PRACTITIONER

## 2022-08-30 PROCEDURE — 99203 PR OFFICE/OUTPT VISIT, NEW, LEVL III, 30-44 MIN: ICD-10-PCS | Mod: S$PBB,,, | Performed by: NURSE PRACTITIONER

## 2022-08-30 PROCEDURE — 99213 OFFICE O/P EST LOW 20 MIN: CPT | Mod: PBBFAC,PO | Performed by: NURSE PRACTITIONER

## 2022-08-30 PROCEDURE — 99999 PR PBB SHADOW E&M-EST. PATIENT-LVL III: CPT | Mod: PBBFAC,,, | Performed by: NURSE PRACTITIONER

## 2022-08-30 NOTE — PROGRESS NOTES
GASTROENTEROLOGY CLINIC NOTE    Chief Complaint: The primary encounter diagnosis was Gallstones. A diagnosis of Pain of upper abdomen was also pertinent to this visit.  Referring provider/PCP: Primary Doctor No    HPI:  Mikayla Ray is a 41 y.o. female who is a new patient to me without a significant PMH.  She is here today to establish care for emergency room follow-up regarding abdominal pain and gallstones.  She sought care in the emergency room about 1-1/2 months ago due to postprandial epigastric pain.  Prep Pap time she was evaluated an ultrasound was obtained.  Multiple gallstones and a dilated CBD of 6 mm was noted.  She was also noted to have elevated LFTs.  She was discharged with prescription for pantoprazole and instructed to follow-up with GI.  Since then her pain has improved and she is no longer experiencing symptoms.  When pain was occurring she describes a daily upper abdominal pain primarily located in the left upper quadrant that was described as burning/stabbing in nature the pain did not radiate but did have a gradual 1 sought and was worse after eating.  This was also accompanied by nausea and vomiting.  Pain was not aggravated or alleviated by having a bowel movement or changes in position.  She also reports significant alcohol use at that time.  Denies reflux, pyrosis, melena, hematochezia, hematemesis, changes in bowel habits.  In addition to EtOH use she also reports drinking energy drinks such as bang.  She is currently not having any GI symptoms.  LFTs were repeated few weeks following ER visit and are within normal limits    Treatments Tried:  Pantoprazole  NSAIDs: No  Anticoagulation or Antiplatelet: No      Prior Upper Endoscopy:  No  Prior Colonoscopy:  No  Family h/o Colon Cancer: No  Family h/o Crohn's Disease or Ulcerative Colitis: No  Family h/o Celiac Sprue: No  Abdominal Surgeries:  No      Review of Systems   Constitutional:  Negative for weight loss.   HENT:  Negative for  "sore throat.    Eyes:  Negative for blurred vision.   Respiratory:  Negative for cough.    Cardiovascular:  Negative for chest pain.   Gastrointestinal:  Negative for abdominal pain, blood in stool, constipation, diarrhea, heartburn, melena, nausea and vomiting.   Genitourinary:  Negative for dysuria.   Musculoskeletal:  Negative for myalgias.   Skin:  Negative for rash.   Neurological:  Negative for headaches.   Endo/Heme/Allergies:  Negative for environmental allergies.   Psychiatric/Behavioral:  Negative for suicidal ideas. The patient is not nervous/anxious.      Past Medical History: has a past medical history of GERD (gastroesophageal reflux disease) and Gluten intolerance.    Past Surgical History: has a past surgical history that includes Tubal ligation and [place] tooth extraction.    Family History:family history includes Diabetes in her mother; Hypertension in her mother.    Allergies:   Review of patient's allergies indicates:   Allergen Reactions    Gluten protein Anaphylaxis       Social History: reports that she has been smoking vaping with nicotine. She has never used smokeless tobacco. She reports current alcohol use. She reports that she does not use drugs.    Home medications:   Current Outpatient Medications on File Prior to Visit   Medication Sig Dispense Refill    [DISCONTINUED] ondansetron (ZOFRAN-ODT) 4 MG TbDL Take 1 tablet (4 mg total) by mouth every 8 (eight) hours as needed (Nausea). 15 tablet 0    [DISCONTINUED] pantoprazole (PROTONIX) 20 MG tablet Take 1 tablet (20 mg total) by mouth once daily. 30 tablet 0     No current facility-administered medications on file prior to visit.       Vital signs:  Ht 5' 6" (1.676 m)   Wt 65.1 kg (143 lb 8.3 oz)   LMP 08/16/2022   BMI 23.16 kg/m²     Physical Exam  Vitals reviewed.   Constitutional:       General: She is not in acute distress.     Appearance: Normal appearance. She is not ill-appearing.   HENT:      Head: Normocephalic. "   Cardiovascular:      Rate and Rhythm: Normal rate and regular rhythm.      Heart sounds: Normal heart sounds. No murmur heard.  Pulmonary:      Effort: Pulmonary effort is normal. No respiratory distress.      Breath sounds: Normal breath sounds.   Chest:      Chest wall: No tenderness.   Abdominal:      General: Bowel sounds are normal. There is no distension.      Palpations: Abdomen is soft.      Tenderness: There is no abdominal tenderness. Negative signs include Kennedy's sign.      Hernia: No hernia is present.   Skin:     General: Skin is warm.   Neurological:      Mental Status: She is alert and oriented to person, place, and time.   Psychiatric:         Mood and Affect: Mood normal.         Behavior: Behavior normal.       Routine labs:  Lab Results   Component Value Date    WBC 9.36 07/10/2022    HGB 14.0 07/10/2022    HCT 40.8 07/10/2022    MCV 92 07/10/2022     07/10/2022     No results found for: INR  No results found for: IRON, FERRITIN, TIBC, FESATURATED  Lab Results   Component Value Date     07/10/2022    K 3.9 07/10/2022     07/10/2022    CO2 21 (L) 07/10/2022    BUN 6 07/10/2022    CREATININE 0.7 07/10/2022     Lab Results   Component Value Date    ALBUMIN 4.1 07/21/2022    ALT 19 07/21/2022    AST 14 07/21/2022    ALKPHOS 60 07/21/2022    BILITOT 0.5 07/21/2022     No results found for: GLUCOSE  No results found for: TSH  Lab Results   Component Value Date    CALCIUM 8.9 07/10/2022       Imaging:  US Abdomen Limited  Narrative: EXAMINATION:  US ABDOMEN LIMITED    CLINICAL HISTORY:  Epigastric pain;    TECHNIQUE:  Limited ultrasound of the right upper quadrant of the abdomen (including pancreas, liver, gallbladder, common bile duct, and spleen) was performed.    COMPARISON:  None.    FINDINGS:  Liver: Normal in size, measuring 14.5 cm. Homogeneous echotexture. No focal hepatic lesions.    Gallbladder: There are multiple mobile gallstones with the largest measuring 3 mm.  No  gallbladder wall thickening or hypervascularity, pericholecystic fluid, or sonographic Kennedy sign.    Biliary system: The common duct is upper normal, measuring 6 mm.  No intrahepatic ductal dilatation.    Spleen: Upper normal in size and echotexture, measuring 11.9 cm.    Miscellaneous: No upper abdominal ascites.  Impression: Cholelithiasis with no additional sonographic findings to strongly suggest acute cholecystitis.    Electronically signed by: Dixie Jaeger MD  Date:    07/10/2022  Time:    12:18      I have reviewed prior labs, imaging, and notes.      Assessment:  1. Gallstones    2. Pain of upper abdomen        Plan:  Orders Placed This Encounter    Ambulatory referral/consult to General Surgery     With all to General surgery to further evaluate gallstones and whether not patient will require cholecystectomy  Consider MRCP in future due to mildly dilated common bowel duct  If symptoms return restart pantoprazole and consider EGD  Avoid EtOH and caffeine moves      Plan of care discussed with patient who is in agreement and verbalized understanding.     I have explained the planned procedures to the patient.The risks, benefits and alternatives of the procedure were also explained in detail. Patient verbalized understanding, all questions were answered. The patient agrees to proceed as planned    Follow Up:  As needed          FLO Parisi,FNP-BC  [facility]

## 2022-09-02 ENCOUNTER — HOSPITAL ENCOUNTER (EMERGENCY)
Facility: OTHER | Age: 42
Discharge: HOME | End: 2022-09-02
Attending: EMERGENCY MEDICINE

## 2022-09-02 VITALS
DIASTOLIC BLOOD PRESSURE: 62 MMHG | WEIGHT: 145 LBS | HEIGHT: 66 IN | HEART RATE: 87 BPM | BODY MASS INDEX: 23.3 KG/M2 | OXYGEN SATURATION: 99 % | SYSTOLIC BLOOD PRESSURE: 103 MMHG | RESPIRATION RATE: 17 BRPM | TEMPERATURE: 98 F

## 2022-09-02 DIAGNOSIS — K80.50 BILIARY COLIC: Primary | ICD-10-CM

## 2022-09-02 LAB
ALBUMIN SERPL BCP-MCNC: 4.3 G/DL (ref 3.5–5.2)
ALP SERPL-CCNC: 65 U/L (ref 55–135)
ALT SERPL W/O P-5'-P-CCNC: 15 U/L (ref 10–44)
ANION GAP SERPL CALC-SCNC: 12 MMOL/L (ref 8–16)
AST SERPL-CCNC: 17 U/L (ref 10–40)
B-HCG UR QL: NEGATIVE
BASOPHILS # BLD AUTO: 0.02 K/UL (ref 0–0.2)
BASOPHILS NFR BLD: 0.3 % (ref 0–1.9)
BILIRUB SERPL-MCNC: 0.6 MG/DL (ref 0.1–1)
BILIRUB UR QL STRIP: NEGATIVE
BUN SERPL-MCNC: 11 MG/DL (ref 6–20)
CALCIUM SERPL-MCNC: 9.7 MG/DL (ref 8.7–10.5)
CHLORIDE SERPL-SCNC: 105 MMOL/L (ref 95–110)
CLARITY UR: CLEAR
CO2 SERPL-SCNC: 20 MMOL/L (ref 23–29)
COLOR UR: YELLOW
CREAT SERPL-MCNC: 0.9 MG/DL (ref 0.5–1.4)
CTP QC/QA: YES
DIFFERENTIAL METHOD: ABNORMAL
EOSINOPHIL # BLD AUTO: 0 K/UL (ref 0–0.5)
EOSINOPHIL NFR BLD: 0.1 % (ref 0–8)
ERYTHROCYTE [DISTWIDTH] IN BLOOD BY AUTOMATED COUNT: 13.4 % (ref 11.5–14.5)
EST. GFR  (NO RACE VARIABLE): >60 ML/MIN/1.73 M^2
GLUCOSE SERPL-MCNC: 120 MG/DL (ref 70–110)
GLUCOSE UR QL STRIP: NEGATIVE
HCT VFR BLD AUTO: 38.8 % (ref 37–48.5)
HGB BLD-MCNC: 13.4 G/DL (ref 12–16)
HGB UR QL STRIP: NEGATIVE
IMM GRANULOCYTES # BLD AUTO: 0.02 K/UL (ref 0–0.04)
IMM GRANULOCYTES NFR BLD AUTO: 0.3 % (ref 0–0.5)
KETONES UR QL STRIP: NEGATIVE
LEUKOCYTE ESTERASE UR QL STRIP: NEGATIVE
LIPASE SERPL-CCNC: 39 U/L (ref 4–60)
LYMPHOCYTES # BLD AUTO: 2 K/UL (ref 1–4.8)
LYMPHOCYTES NFR BLD: 24.8 % (ref 18–48)
MCH RBC QN AUTO: 31.3 PG (ref 27–31)
MCHC RBC AUTO-ENTMCNC: 34.5 G/DL (ref 32–36)
MCV RBC AUTO: 91 FL (ref 82–98)
MONOCYTES # BLD AUTO: 0.6 K/UL (ref 0.3–1)
MONOCYTES NFR BLD: 7 % (ref 4–15)
NEUTROPHILS # BLD AUTO: 5.4 K/UL (ref 1.8–7.7)
NEUTROPHILS NFR BLD: 67.5 % (ref 38–73)
NITRITE UR QL STRIP: NEGATIVE
NRBC BLD-RTO: 0 /100 WBC
PH UR STRIP: 7 [PH] (ref 5–8)
PLATELET # BLD AUTO: 217 K/UL (ref 150–450)
PMV BLD AUTO: 10.7 FL (ref 9.2–12.9)
POTASSIUM SERPL-SCNC: 3.6 MMOL/L (ref 3.5–5.1)
PROT SERPL-MCNC: 7.9 G/DL (ref 6–8.4)
PROT UR QL STRIP: NEGATIVE
RBC # BLD AUTO: 4.28 M/UL (ref 4–5.4)
SODIUM SERPL-SCNC: 137 MMOL/L (ref 136–145)
SP GR UR STRIP: 1.02 (ref 1–1.03)
URN SPEC COLLECT METH UR: NORMAL
UROBILINOGEN UR STRIP-ACNC: NEGATIVE EU/DL
WBC # BLD AUTO: 8 K/UL (ref 3.9–12.7)

## 2022-09-02 PROCEDURE — 25000003 PHARM REV CODE 250: Performed by: EMERGENCY MEDICINE

## 2022-09-02 PROCEDURE — 96361 HYDRATE IV INFUSION ADD-ON: CPT

## 2022-09-02 PROCEDURE — 96374 THER/PROPH/DIAG INJ IV PUSH: CPT

## 2022-09-02 PROCEDURE — 81025 URINE PREGNANCY TEST: CPT | Performed by: EMERGENCY MEDICINE

## 2022-09-02 PROCEDURE — 96375 TX/PRO/DX INJ NEW DRUG ADDON: CPT

## 2022-09-02 PROCEDURE — 99285 EMERGENCY DEPT VISIT HI MDM: CPT | Mod: 25

## 2022-09-02 PROCEDURE — 63600175 PHARM REV CODE 636 W HCPCS: Performed by: EMERGENCY MEDICINE

## 2022-09-02 PROCEDURE — 85025 COMPLETE CBC W/AUTO DIFF WBC: CPT | Performed by: EMERGENCY MEDICINE

## 2022-09-02 PROCEDURE — 80053 COMPREHEN METABOLIC PANEL: CPT | Performed by: EMERGENCY MEDICINE

## 2022-09-02 PROCEDURE — 81003 URINALYSIS AUTO W/O SCOPE: CPT | Performed by: EMERGENCY MEDICINE

## 2022-09-02 PROCEDURE — 83690 ASSAY OF LIPASE: CPT | Performed by: EMERGENCY MEDICINE

## 2022-09-02 RX ORDER — HYDROCODONE BITARTRATE AND ACETAMINOPHEN 7.5; 325 MG/1; MG/1
1 TABLET ORAL EVERY 6 HOURS PRN
Qty: 12 TABLET | Refills: 0 | Status: SHIPPED | OUTPATIENT
Start: 2022-09-02 | End: 2022-09-05

## 2022-09-02 RX ORDER — ONDANSETRON 2 MG/ML
4 INJECTION INTRAMUSCULAR; INTRAVENOUS
Status: COMPLETED | OUTPATIENT
Start: 2022-09-02 | End: 2022-09-02

## 2022-09-02 RX ORDER — ONDANSETRON 4 MG/1
4 TABLET, ORALLY DISINTEGRATING ORAL EVERY 6 HOURS PRN
Qty: 20 TABLET | Refills: 0 | Status: SHIPPED | OUTPATIENT
Start: 2022-09-02

## 2022-09-02 RX ORDER — MORPHINE SULFATE 4 MG/ML
4 INJECTION, SOLUTION INTRAMUSCULAR; INTRAVENOUS
Status: COMPLETED | OUTPATIENT
Start: 2022-09-02 | End: 2022-09-02

## 2022-09-02 RX ORDER — DOCUSATE SODIUM 100 MG/1
100 CAPSULE, LIQUID FILLED ORAL 2 TIMES DAILY PRN
Qty: 60 CAPSULE | Refills: 0 | Status: SHIPPED | OUTPATIENT
Start: 2022-09-02

## 2022-09-02 RX ORDER — HYDROMORPHONE HYDROCHLORIDE 1 MG/ML
1 INJECTION, SOLUTION INTRAMUSCULAR; INTRAVENOUS; SUBCUTANEOUS
Status: COMPLETED | OUTPATIENT
Start: 2022-09-02 | End: 2022-09-02

## 2022-09-02 RX ADMIN — MORPHINE SULFATE 4 MG: 4 INJECTION, SOLUTION INTRAMUSCULAR; INTRAVENOUS at 08:09

## 2022-09-02 RX ADMIN — ONDANSETRON 4 MG: 2 INJECTION INTRAMUSCULAR; INTRAVENOUS at 08:09

## 2022-09-02 RX ADMIN — SODIUM CHLORIDE 1000 ML: 0.9 INJECTION, SOLUTION INTRAVENOUS at 08:09

## 2022-09-02 RX ADMIN — HYDROMORPHONE HYDROCHLORIDE 1 MG: 1 INJECTION, SOLUTION INTRAMUSCULAR; INTRAVENOUS; SUBCUTANEOUS at 10:09

## 2022-09-02 NOTE — Clinical Note
"Mikayla "Mikayla" Ray was seen and treated in our emergency department on 9/2/2022.  She may return to work on 09/04/2022.       If you have any questions or concerns, please don't hesitate to call.      Vinny Poloe MD"

## 2022-09-03 NOTE — ED NOTES
Pt arrives with reports of RUQ pain and n/v/d starting at 1430 today. Pt with hx of gallstones and she reports this pain is the same. Pt states her follow up is next week. VSS. Call bell within reach. Will continue to monitor.

## 2022-09-03 NOTE — ED PROVIDER NOTES
"Encounter Date: 9/2/2022    SCRIBE #1 NOTE: I, Shu Diop, am scribing for, and in the presence of,  Vinny Poole MD.     History     Chief Complaint   Patient presents with    Abdominal Pain     Pt advised she is having diffuse abd pain that started around 1430hrs - pt has been diagnosed with " gall stones "      Time seen by provider: 8:11 PM    This is a 41 y.o. female with PMHx of GERD and gluten intolerance, who presents with complaint of constant epigastric and RUQ abdominal pain x 6 hours. Also reports N/V. No diarrhea. No urinary or bowel changes. Of note, patient saw GI 2 days ago in [place] and was diagnosed with gallstones. Her LMP was 1 week ago. Her PSHx includes tubal ligation. She admits to vaping and drinking about 3-4 times per week.    This is the extent of the patient's complaints at this time.    The history is provided by the patient.   Review of patient's allergies indicates:   Allergen Reactions    Gluten protein Anaphylaxis     Past Medical History:   Diagnosis Date    GERD (gastroesophageal reflux disease)     Gluten intolerance      Past Surgical History:   Procedure Laterality Date    TUBAL LIGATION      WISDOM TOOTH EXTRACTION       Family History   Problem Relation Age of Onset    Hypertension Mother     Diabetes Mother      Social History     Tobacco Use    Smoking status: Every Day     Types: Vaping with nicotine    Smokeless tobacco: Never   Substance Use Topics    Alcohol use: Yes     Comment: twice a week, 1 drink last night    Drug use: Never     Review of Systems   Constitutional:  Negative for chills and fever.   HENT:  Negative for rhinorrhea, sore throat and trouble swallowing.    Respiratory:  Negative for chest tightness, shortness of breath and wheezing.    Cardiovascular:  Negative for chest pain, palpitations and leg swelling.   Gastrointestinal:  Positive for abdominal pain, nausea and vomiting. Negative for blood in stool, constipation and diarrhea.   Genitourinary: "  Negative for dysuria, frequency, hematuria, urgency and vaginal bleeding.   Neurological:  Negative for speech difficulty and light-headedness.   All other systems reviewed and are negative.    Physical Exam     Initial Vitals [09/02/22 1925]   BP Pulse Resp Temp SpO2   (!) 149/84 75 18 98.2 °F (36.8 °C) 99 %      MAP       --         Physical Exam    Nursing note and vitals reviewed.  Constitutional: She appears well-developed and well-nourished. She is diaphoretic. She appears distressed.   HENT:   Head: Normocephalic and atraumatic.   Right Ear: External ear normal.   Left Ear: External ear normal.   Eyes: Conjunctivae and EOM are normal. Pupils are equal, round, and reactive to light.   Neck: Neck supple. No tracheal deviation present.   Normal range of motion.  Cardiovascular:  Normal rate, regular rhythm, normal heart sounds and intact distal pulses.     Exam reveals no gallop and no friction rub.       No murmur heard.  Pulmonary/Chest: Breath sounds normal. No respiratory distress. She has no wheezes. She has no rhonchi. She has no rales. She exhibits no tenderness.   Abdominal: Abdomen is soft. Bowel sounds are normal. She exhibits no distension and no mass. There is abdominal tenderness (right upper quadrant, positive Kennedy's). There is no rebound and no guarding.   Musculoskeletal:         General: No tenderness or edema. Normal range of motion.      Cervical back: Normal range of motion and neck supple.     Neurological: She is alert and oriented to person, place, and time. She has normal strength.   Skin: Skin is warm. Capillary refill takes less than 2 seconds. There is pallor.   Psychiatric: She has a normal mood and affect. Thought content normal.       ED Course   Procedures  Labs Reviewed   CBC W/ AUTO DIFFERENTIAL - Abnormal; Notable for the following components:       Result Value    MCH 31.3 (*)     All other components within normal limits   COMPREHENSIVE METABOLIC PANEL - Abnormal; Notable  for the following components:    CO2 20 (*)     Glucose 120 (*)     All other components within normal limits   LIPASE   URINALYSIS, REFLEX TO URINE CULTURE    Narrative:     Specimen Source->Urine   POCT URINE PREGNANCY          Imaging Results              US Abdomen Limited (Final result)  Result time 09/02/22 22:33:31      Final result by Thiago Javed MD (09/02/22 22:33:31)                   Impression:      Cholelithiasis is noted, and the common duct is borderline measuring approximately 6.5 mm, there is no additional sonographic evidence for acute cholecystitis.  Close clinical and historical correlation is needed.      Electronically signed by: Thiago Javed  Date:    09/02/2022  Time:    22:33               Narrative:    EXAMINATION:  US ABDOMEN LIMITED    CLINICAL HISTORY:  ruq pain;    TECHNIQUE:  Limited ultrasound of the right upper quadrant of the abdomen (including pancreas, liver, gallbladder, common bile duct) was performed.    COMPARISON:  Ultrasound abdomen limited July 10, 2022    FINDINGS:  The visualized aspects of the pancreas appear unremarkable, the pancreas not seen in its entirety.    There is mild to moderate gallbladder distention.  Cholelithiasis is noted.  There are multiple gallstones measuring up to approximately 5.1 mm in size.  There is no abnormal gallbladder wall thickening and there is no evidence for pericholecystic fluid.  The technologist does not indicate the presence of or absence of a sonographic Kennedy's sign, however indicates the patient did receive pain medication which would limit the utility of the absence of a sonographic Kennedy's sign.    Common duct is borderline diameter measuring approximately 6.5 mm compared to 6.4 mm on the prior study.  There is no abnormal intrahepatic biliary dilatation.    Limited imaging of the liver demonstrates no sonographic evidence for focal hepatic mass lesion.  Limited imaging of the right kidney demonstrates no evidence for  hydronephrosis.  There is no abnormal free fluid of the abdomen.                                       Medications   sodium chloride 0.9% bolus 1,000 mL (1,000 mLs Intravenous New Bag 9/2/22 2047)   ondansetron injection 4 mg (4 mg Intravenous Given 9/2/22 2049)   morphine injection 4 mg (4 mg Intravenous Given 9/2/22 2050)   HYDROmorphone injection 1 mg (1 mg Intravenous Given 9/2/22 2218)     Medical Decision Making:   History:   Old Medical Records: I decided to obtain old medical records.  Differential Diagnosis:   acute pyelonephritis, ureterolithiais, gastritis, ulcer, cholecystitis, gallstones, pancreatitis, small bowel obstruction  Clinical Tests:   Lab Tests: Ordered and Reviewed  ED Management:   Discussed with patient that her ultrasound was without any significant findings except for borderline CBD.  Patient reports that her pain is resolved.  Repeat abdominal exam was benign, patient is no longer diaphoretic and has a benign abdominal exam.  I believe that her biliary colic is resolved, but  I did emphasized to restrict dietary changes needed until she follows up with General surgery for a  cholecystectomy.  Patient discharged home in stable condition. Diagnosis and treatment plan explained to patient. No further workup indicated based on their complaints or examination today. Discussed results with the patient. I educated the patient/guardian on the warning signs and symptoms for which they must seek immediate medical attention. All questions addressed and patient/guardian were given discharge instructions and followup information.         Scribe Attestation:   Scribe #1: I performed the above scribed service and the documentation accurately describes the services I performed. I attest to the accuracy of the note.           Physician Attestation for Scribe: I, MAA, reviewed documentation as scribed in my presence, which is both accurate and complete.      Clinical Impression:   Final  diagnoses:  [K80.50] Biliary colic (Primary)      ED Disposition Condition    Discharge Stable          ED Prescriptions       Medication Sig Dispense Start Date End Date Auth. Provider    ondansetron (ZOFRAN-ODT) 4 MG TbDL Take 1 tablet (4 mg total) by mouth every 6 (six) hours as needed (Nausea and vomiting). 20 tablet 9/2/2022 -- Vinny Poole MD    HYDROcodone-acetaminophen (NORCO) 7.5-325 mg per tablet Take 1 tablet by mouth every 6 (six) hours as needed for Pain. 12 tablet 9/2/2022 9/5/2022 Vinny Poole MD    docusate sodium (COLACE) 100 MG capsule Take 1 capsule (100 mg total) by mouth 2 (two) times daily as needed for Constipation. 60 capsule 9/2/2022 -- Vinny Poole MD          Follow-up Information       Follow up With Specialties Details Why Contact Info    Manas Badillo Jr., MD General Surgery, Vascular Surgery Schedule an appointment as soon as possible for a visit in 3 days For follow-up and re-evaluation of gall stones [address] [address]  [phone]      [facility] - Emergency Dept Emergency Medicine  As needed, for any new or worsening symptoms [address] [facility] [id number] [phone]    [facility] - Trauma/General Surgery Trauma Surgery, General Surgery, Surgery Schedule an appointment as soon as possible for a visit in 3 days For follow-up and re-evaluation of gallstones [address] [facility] [address]  [phone]               Vinny Poole MD  09/02/22 [id number]

## 2022-09-27 ENCOUNTER — TELEPHONE (OUTPATIENT)
Dept: ADMINISTRATIVE | Facility: OTHER | Age: 42
End: 2022-09-27